# Patient Record
Sex: FEMALE | Race: WHITE | NOT HISPANIC OR LATINO | Employment: UNEMPLOYED | URBAN - NONMETROPOLITAN AREA
[De-identification: names, ages, dates, MRNs, and addresses within clinical notes are randomized per-mention and may not be internally consistent; named-entity substitution may affect disease eponyms.]

---

## 2023-07-21 PROBLEM — F41.1 GENERALIZED ANXIETY DISORDER WITH PANIC ATTACKS: Status: ACTIVE | Noted: 2023-07-21

## 2023-07-21 PROBLEM — Z90.13 H/O BILATERAL MASTECTOMY: Chronic | Status: ACTIVE | Noted: 2023-07-21

## 2023-07-21 PROBLEM — Z85.3 HISTORY OF LEFT BREAST CANCER: Status: ACTIVE | Noted: 2023-07-21

## 2023-07-21 PROBLEM — F41.1 GENERALIZED ANXIETY DISORDER WITH PANIC ATTACKS: Chronic | Status: ACTIVE | Noted: 2023-07-21

## 2023-07-21 PROBLEM — Z90.13 H/O BILATERAL MASTECTOMY: Status: ACTIVE | Noted: 2023-07-21

## 2023-07-21 PROBLEM — Z85.3 HISTORY OF LEFT BREAST CANCER: Chronic | Status: ACTIVE | Noted: 2023-07-21

## 2023-07-21 PROBLEM — F41.0 GENERALIZED ANXIETY DISORDER WITH PANIC ATTACKS: Status: ACTIVE | Noted: 2023-07-21

## 2023-07-21 PROBLEM — E66.01 CLASS 3 SEVERE OBESITY DUE TO EXCESS CALORIES WITH SERIOUS COMORBIDITY AND BODY MASS INDEX (BMI) OF 40.0 TO 44.9 IN ADULT: Status: ACTIVE | Noted: 2023-07-21

## 2023-07-21 PROBLEM — K21.9 GERD WITHOUT ESOPHAGITIS: Chronic | Status: ACTIVE | Noted: 2023-07-21

## 2023-07-21 PROBLEM — Z98.82 HISTORY OF BILATERAL BREAST IMPLANTS: Status: ACTIVE | Noted: 2023-07-21

## 2023-07-21 PROBLEM — E66.813 CLASS 3 SEVERE OBESITY DUE TO EXCESS CALORIES WITH SERIOUS COMORBIDITY AND BODY MASS INDEX (BMI) OF 40.0 TO 44.9 IN ADULT: Status: ACTIVE | Noted: 2023-07-21

## 2023-07-21 PROBLEM — Z12.11 SCREENING FOR COLON CANCER: Status: ACTIVE | Noted: 2023-07-21

## 2023-07-21 PROBLEM — E66.01 CLASS 3 SEVERE OBESITY DUE TO EXCESS CALORIES WITH SERIOUS COMORBIDITY AND BODY MASS INDEX (BMI) OF 40.0 TO 44.9 IN ADULT: Chronic | Status: ACTIVE | Noted: 2023-07-21

## 2023-07-21 PROBLEM — E66.813 CLASS 3 SEVERE OBESITY DUE TO EXCESS CALORIES WITH SERIOUS COMORBIDITY AND BODY MASS INDEX (BMI) OF 40.0 TO 44.9 IN ADULT: Chronic | Status: ACTIVE | Noted: 2023-07-21

## 2023-07-21 PROBLEM — K22.70 BARRETT'S ESOPHAGUS WITHOUT DYSPLASIA: Status: ACTIVE | Noted: 2023-07-21

## 2023-07-21 PROBLEM — K21.9 GERD WITHOUT ESOPHAGITIS: Status: ACTIVE | Noted: 2023-07-21

## 2023-07-21 PROBLEM — K22.70 BARRETT'S ESOPHAGUS WITHOUT DYSPLASIA: Chronic | Status: ACTIVE | Noted: 2023-07-21

## 2023-07-21 PROBLEM — Z00.00 GENERAL MEDICAL EXAM: Status: ACTIVE | Noted: 2023-07-21

## 2023-07-21 PROBLEM — F41.0 GENERALIZED ANXIETY DISORDER WITH PANIC ATTACKS: Chronic | Status: ACTIVE | Noted: 2023-07-21

## 2023-07-21 PROBLEM — T85.43XA RUPTURED RIGHT BREAST IMPLANT: Status: ACTIVE | Noted: 2023-07-21

## 2023-07-21 PROBLEM — Z90.710 H/O TOTAL HYSTERECTOMY: Chronic | Status: ACTIVE | Noted: 2023-07-21

## 2023-07-21 PROBLEM — Z90.710 H/O TOTAL HYSTERECTOMY: Status: ACTIVE | Noted: 2023-07-21

## 2023-08-18 ENCOUNTER — OFFICE VISIT (OUTPATIENT)
Dept: FAMILY MEDICINE CLINIC | Facility: CLINIC | Age: 53
End: 2023-08-18
Payer: COMMERCIAL

## 2023-08-18 ENCOUNTER — TELEPHONE (OUTPATIENT)
Dept: FAMILY MEDICINE CLINIC | Facility: CLINIC | Age: 53
End: 2023-08-18

## 2023-08-18 VITALS
WEIGHT: 223.5 LBS | HEART RATE: 87 BPM | OXYGEN SATURATION: 98 % | HEIGHT: 62 IN | DIASTOLIC BLOOD PRESSURE: 82 MMHG | BODY MASS INDEX: 41.13 KG/M2 | SYSTOLIC BLOOD PRESSURE: 140 MMHG

## 2023-08-18 DIAGNOSIS — Z13.1 DIABETES MELLITUS SCREENING: ICD-10-CM

## 2023-08-18 DIAGNOSIS — E66.01 CLASS 3 SEVERE OBESITY DUE TO EXCESS CALORIES WITH SERIOUS COMORBIDITY AND BODY MASS INDEX (BMI) OF 40.0 TO 44.9 IN ADULT: Chronic | ICD-10-CM

## 2023-08-18 DIAGNOSIS — K22.70 BARRETT'S ESOPHAGUS WITHOUT DYSPLASIA: Chronic | ICD-10-CM

## 2023-08-18 DIAGNOSIS — H81.12 BENIGN PAROXYSMAL POSITIONAL VERTIGO OF LEFT EAR: ICD-10-CM

## 2023-08-18 DIAGNOSIS — K21.9 GERD WITHOUT ESOPHAGITIS: ICD-10-CM

## 2023-08-18 DIAGNOSIS — F41.0 GENERALIZED ANXIETY DISORDER WITH PANIC ATTACKS: Primary | Chronic | ICD-10-CM

## 2023-08-18 DIAGNOSIS — Z00.00 GENERAL MEDICAL EXAM: Primary | ICD-10-CM

## 2023-08-18 DIAGNOSIS — T85.43XD RUPTURE OF IMPLANT OF RIGHT BREAST, SUBSEQUENT ENCOUNTER: ICD-10-CM

## 2023-08-18 DIAGNOSIS — F41.1 GENERALIZED ANXIETY DISORDER WITH PANIC ATTACKS: Primary | Chronic | ICD-10-CM

## 2023-08-18 RX ORDER — ESOMEPRAZOLE MAGNESIUM 40 MG/1
40 CAPSULE, DELAYED RELEASE ORAL
Qty: 90 CAPSULE | Refills: 1 | Status: SHIPPED | OUTPATIENT
Start: 2023-08-18

## 2023-08-18 RX ORDER — BUPROPION HYDROCHLORIDE 150 MG/1
150 TABLET ORAL EVERY MORNING
Qty: 90 TABLET | Refills: 1 | Status: SHIPPED | OUTPATIENT
Start: 2023-08-18

## 2023-08-18 NOTE — ASSESSMENT & PLAN NOTE
Given modified Epley maneuvers and discussed mild blood pressure elevation today.  We will recheck at follow-up in 6 months

## 2023-08-18 NOTE — PROGRESS NOTES
"Chief Complaint  Med Refill    Subjective    History of Present Illness:  Tania HERMOSILLO is a 53 y.o. female who presents today for follow-up after restarting treatment with Wellbutrin for anxiety and mood.    She does have a history of GERD with proven Velasquez's esophagus based upon EGD without dysplasia.  Her last EGD was 1 year ago with Dr. Mena of the [facility].  She does have plans for follow-up EGD with GI.  She continues on Nexium otc.      Prior history of left breast cancer status post bilateral mastectomy approximately 10 years ago.  She does have bilateral implants and had a recent CT scan done that showed some potential leakage of her right implant.  She is being scheduled with plastic surgery given concerns with near rupture of her right breast implant.    She declines colonoscopy but did get Cologuard done which returned negative in August 2023 with plan to repeat in August 2026.    She is on wegovy through wt loss clinic but is planning on bariatric surgery consultation.     Flareups with positional vertigo which is happened in the past.  She is interested in modified Epley maneuvers and will add meclizine over-the-counter    Objective   Vital Signs:   /82   Pulse 87   Ht 157.5 cm (62\")   Wt 101 kg (223 lb 8 oz)   SpO2 98%   BMI 40.88 kg/mý     Review of Systems   Constitutional:  Negative for appetite change, chills and fever.   HENT:  Negative for hearing loss.    Eyes:  Negative for blurred vision.   Respiratory:  Negative for chest tightness.    Cardiovascular:  Negative for chest pain.   Gastrointestinal:  Negative for abdominal pain.   Musculoskeletal:  Negative for gait problem.   Skin:  Negative for rash.   Neurological:  Positive for dizziness.   Psychiatric/Behavioral:  Negative for depressed mood.      Past History:  Medical History: has no past medical history on file.   Surgical History: has no past surgical history on file.   Family History: family history is not on " file.   Social History: reports that she has never smoked. She has never used smokeless tobacco.      Current Outpatient Medications:     buPROPion XL (Wellbutrin XL) 150 MG 24 hr tablet, Take 1 tablet by mouth Every Morning., Disp: 90 tablet, Rfl: 1    esomeprazole (nexIUM) 40 MG capsule, Take 1 capsule by mouth Every Morning Before Breakfast. BIOPSY PROVEN MILES'S ESOPHAGUS., Disp: 90 capsule, Rfl: 1    Allergies: Patient has no known allergies.    Physical Exam  Constitutional:       Appearance: Normal appearance.   HENT:      Head: Normocephalic.      Right Ear: External ear normal.      Left Ear: External ear normal.      Nose: Nose normal.   Eyes:      Pupils: Pupils are equal, round, and reactive to light.   Cardiovascular:      Rate and Rhythm: Normal rate and regular rhythm.      Heart sounds: Normal heart sounds.   Pulmonary:      Effort: Pulmonary effort is normal.      Breath sounds: Normal breath sounds.   Musculoskeletal:         General: Normal range of motion.      Cervical back: Normal range of motion.   Skin:     General: Skin is warm and dry.   Neurological:      General: No focal deficit present.      Mental Status: She is alert.      Comments: Recurrent positional left foot vertigo.  Given modified Epley's maneuvers and instructions on how to add in meclizine as needed   Psychiatric:         Mood and Affect: Mood normal.         Behavior: Behavior normal.         Thought Content: Thought content normal.        Result Review                   Assessment and Plan  Diagnoses and all orders for this visit:    1. Generalized anxiety disorder with panic attacks (Primary)  Assessment & Plan:  Psychological condition is improving with treatment.  Continue current treatment regimen.  Psychological condition  will be reassessed at the next regular appointment.    Orders:  -     buPROPion XL (Wellbutrin XL) 150 MG 24 hr tablet; Take 1 tablet by mouth Every Morning.  Dispense: 90 tablet; Refill: 1    2.  Miles's esophagus without dysplasia  Assessment & Plan:  Continue Nexium    Orders:  -     esomeprazole (nexIUM) 40 MG capsule; Take 1 capsule by mouth Every Morning Before Breakfast. BIOPSY PROVEN MILES'S ESOPHAGUS.  Dispense: 90 capsule; Refill: 1    3. Rupture of implant of right breast, subsequent encounter  Assessment & Plan:  Appointment scheduled with plastic surgery given leakage of her right breast implant      4. Class 3 severe obesity due to excess calories with serious comorbidity and body mass index (BMI) of 40.0 to 44.9 in adult  Assessment & Plan:  Patient's (Body mass index is 40.88 kg/mý.) indicates that they are morbidly/severely obese (BMI > 40 or > 35 with obesity - related health condition) with health conditions that include GERD   . Weight is improving with treatment. BMI  is above average; BMI management plan is completed. We discussed portion control and increasing exercise.       5. GERD without esophagitis  Assessment & Plan:  Working on prior authorization for Nexium given she has biopsy-proven Miles's.  Continue with Nexium over-the-counter but hopefully we can get her prior authorization approved    Orders:  -     esomeprazole (nexIUM) 40 MG capsule; Take 1 capsule by mouth Every Morning Before Breakfast. BIOPSY PROVEN MILES'S ESOPHAGUS.  Dispense: 90 capsule; Refill: 1    6. Benign paroxysmal positional vertigo of left ear  Assessment & Plan:  Given modified Epley maneuvers and discussed mild blood pressure elevation today.  We will recheck at follow-up in 6 months                     Follow Up  Return in about 6 months (around 2/18/2024) for Med recheck.    Hung Castillo MD

## 2023-08-18 NOTE — ASSESSMENT & PLAN NOTE
Patient's (Body mass index is 40.88 kg/mý.) indicates that they are morbidly/severely obese (BMI > 40 or > 35 with obesity - related health condition) with health conditions that include GERD   . Weight is improving with treatment. BMI  is above average; BMI management plan is completed. We discussed portion control and increasing exercise.

## 2023-08-18 NOTE — ASSESSMENT & PLAN NOTE
Working on prior authorization for Nexium given she has biopsy-proven Velasquez's.  Continue with Nexium over-the-counter but hopefully we can get her prior authorization approved

## 2023-08-23 ENCOUNTER — LAB (OUTPATIENT)
Dept: FAMILY MEDICINE CLINIC | Facility: CLINIC | Age: 53
End: 2023-08-23
Payer: COMMERCIAL

## 2023-08-23 DIAGNOSIS — Z00.00 GENERAL MEDICAL EXAM: ICD-10-CM

## 2023-08-23 DIAGNOSIS — Z13.1 DIABETES MELLITUS SCREENING: ICD-10-CM

## 2023-08-23 PROCEDURE — 36415 COLL VENOUS BLD VENIPUNCTURE: CPT | Performed by: FAMILY MEDICINE

## 2023-08-24 LAB
ALBUMIN SERPL-MCNC: 4.6 G/DL (ref 3.8–4.9)
ALBUMIN/GLOB SERPL: 2.2 {RATIO} (ref 1.2–2.2)
ALP SERPL-CCNC: 114 IU/L (ref 44–121)
ALT SERPL-CCNC: 9 IU/L (ref 0–32)
AST SERPL-CCNC: 8 IU/L (ref 0–40)
BASOPHILS # BLD AUTO: 0 X10E3/UL (ref 0–0.2)
BASOPHILS NFR BLD AUTO: 1 %
BILIRUB SERPL-MCNC: 0.4 MG/DL (ref 0–1.2)
BUN SERPL-MCNC: 13 MG/DL (ref 6–24)
BUN/CREAT SERPL: 14 (ref 9–23)
CALCIUM SERPL-MCNC: 9.4 MG/DL (ref 8.7–10.2)
CHLORIDE SERPL-SCNC: 105 MMOL/L (ref 96–106)
CHOLEST SERPL-MCNC: 178 MG/DL (ref 100–199)
CO2 SERPL-SCNC: 22 MMOL/L (ref 20–29)
CREAT SERPL-MCNC: 0.92 MG/DL (ref 0.57–1)
EGFRCR SERPLBLD CKD-EPI 2021: 75 ML/MIN/1.73
EOSINOPHIL # BLD AUTO: 0.1 X10E3/UL (ref 0–0.4)
EOSINOPHIL NFR BLD AUTO: 2 %
ERYTHROCYTE [DISTWIDTH] IN BLOOD BY AUTOMATED COUNT: 13.2 % (ref 11.7–15.4)
GLOBULIN SER CALC-MCNC: 2.1 G/DL (ref 1.5–4.5)
GLUCOSE SERPL-MCNC: 85 MG/DL (ref 70–99)
HBA1C MFR BLD: 5.6 % (ref 4.8–5.6)
HCT VFR BLD AUTO: 39.5 % (ref 34–46.6)
HDLC SERPL-MCNC: 76 MG/DL
HGB BLD-MCNC: 13.1 G/DL (ref 11.1–15.9)
IMM GRANULOCYTES # BLD AUTO: 0 X10E3/UL (ref 0–0.1)
IMM GRANULOCYTES NFR BLD AUTO: 0 %
LDLC SERPL CALC-MCNC: 90 MG/DL (ref 0–99)
LYMPHOCYTES # BLD AUTO: 1.5 X10E3/UL (ref 0.7–3.1)
LYMPHOCYTES NFR BLD AUTO: 36 %
MCH RBC QN AUTO: 27.8 PG (ref 26.6–33)
MCHC RBC AUTO-ENTMCNC: 33.2 G/DL (ref 31.5–35.7)
MCV RBC AUTO: 84 FL (ref 79–97)
MONOCYTES # BLD AUTO: 0.2 X10E3/UL (ref 0.1–0.9)
MONOCYTES NFR BLD AUTO: 5 %
NEUTROPHILS # BLD AUTO: 2.3 X10E3/UL (ref 1.4–7)
NEUTROPHILS NFR BLD AUTO: 56 %
PLATELET # BLD AUTO: 183 X10E3/UL (ref 150–450)
POTASSIUM SERPL-SCNC: 4.6 MMOL/L (ref 3.5–5.2)
PROT SERPL-MCNC: 6.7 G/DL (ref 6–8.5)
RBC # BLD AUTO: 4.72 X10E6/UL (ref 3.77–5.28)
SODIUM SERPL-SCNC: 142 MMOL/L (ref 134–144)
T4 FREE SERPL-MCNC: 1.32 NG/DL (ref 0.82–1.77)
TRIGL SERPL-MCNC: 64 MG/DL (ref 0–149)
TSH SERPL DL<=0.005 MIU/L-ACNC: 2.89 UIU/ML (ref 0.45–4.5)
VLDLC SERPL CALC-MCNC: 12 MG/DL (ref 5–40)
WBC # BLD AUTO: 4.1 X10E3/UL (ref 3.4–10.8)

## 2023-10-06 ENCOUNTER — OFFICE VISIT (OUTPATIENT)
Dept: FAMILY MEDICINE CLINIC | Facility: CLINIC | Age: 53
End: 2023-10-06
Payer: COMMERCIAL

## 2023-10-06 VITALS
HEIGHT: 62 IN | DIASTOLIC BLOOD PRESSURE: 78 MMHG | BODY MASS INDEX: 40.96 KG/M2 | WEIGHT: 222.6 LBS | SYSTOLIC BLOOD PRESSURE: 128 MMHG

## 2023-10-06 DIAGNOSIS — Z01.818 PREOPERATIVE EXAMINATION: Primary | ICD-10-CM

## 2023-10-06 PROCEDURE — 99213 OFFICE O/P EST LOW 20 MIN: CPT | Performed by: PHYSICIAN ASSISTANT

## 2023-10-06 PROCEDURE — 93000 ELECTROCARDIOGRAM COMPLETE: CPT | Performed by: PHYSICIAN ASSISTANT

## 2023-10-06 NOTE — PROGRESS NOTES
"    Office Note     Name: Tania HERMOSILLO    : 1970     MRN: [id number]     Chief Complaint  Pre-op Exam    Subjective     History of Present Illness:  Tania HERMOSILLO is a 53 y.o. female who presents today for preop evaluation before having her breast implants replaced after finding out that they had been draining.  She states that she is scheduled for 10/11/2023.  She states that she has already had her preop, but all she needs is an EKG to be able to have the surgery.  She denies any history of problems with anesthesia.    Review of Systems:   Review of Systems   Constitutional:  Negative for fever.   Respiratory:  Negative for shortness of breath.    Cardiovascular:  Negative for chest pain and palpitations.       Past Medical History: No past medical history on file.    Past Surgical History: No past surgical history on file.    Family History: No family history on file.    Social History:   Social History     Socioeconomic History    Marital status:    Tobacco Use    Smoking status: Never    Smokeless tobacco: Never       Immunizations:   Immunization History   Administered Date(s) Administered    31-influenza Vac Quardvalent Preservativ 2017    Fluzone (or Fluarix & Flulaval for VFC) >6mos 2014    Tdap 2017        Medications:     Current Outpatient Medications:     buPROPion XL (Wellbutrin XL) 150 MG 24 hr tablet, Take 1 tablet by mouth Every Morning., Disp: 90 tablet, Rfl: 1    esomeprazole (nexIUM) 40 MG capsule, Take 1 capsule by mouth Every Morning Before Breakfast. BIOPSY PROVEN MILES'S ESOPHAGUS., Disp: 90 capsule, Rfl: 1    Allergies:   No Known Allergies    Objective     Vital Signs  /78   Ht 157.5 cm (62\")   Wt 101 kg (222 lb 9.6 oz)   BMI 40.71 kg/m²   Estimated body mass index is 40.71 kg/m² as calculated from the following:    Height as of this encounter: 157.5 cm (62\").    Weight as of this encounter: 101 kg (222 lb 9.6 oz).        Physical " Exam  Constitutional:       Appearance: Normal appearance.   HENT:      Head: Normocephalic.      Right Ear: External ear normal.      Left Ear: External ear normal.      Nose: Nose normal.   Eyes:      Pupils: Pupils are equal, round, and reactive to light.   Cardiovascular:      Rate and Rhythm: Normal rate and regular rhythm.      Heart sounds: Normal heart sounds.   Pulmonary:      Effort: Pulmonary effort is normal. No respiratory distress.      Breath sounds: Normal breath sounds.   Musculoskeletal:         General: Normal range of motion.      Cervical back: Normal range of motion.   Skin:     General: Skin is warm and dry.   Neurological:      General: No focal deficit present.      Mental Status: She is alert.   Psychiatric:         Mood and Affect: Mood normal.         Behavior: Behavior normal.         Thought Content: Thought content normal.          ECG 12 Lead    Date/Time: 10/6/2023 10:20 AM  Performed by: Virgen Raines PA-C    Authorized by: Virgen Raines PA-C  Comparison: not compared with previous ECG   Previous ECG: no previous ECG available  Rhythm: sinus rhythm    Clinical impression: normal ECG        Assessment and Plan     Assessment/Plan:  Diagnoses and all orders for this visit:    1. Preoperative examination (Primary)  Comments:  Her EKG is WNL.    Other orders  -     ECG 12 Lead        Follow Up  Return for next scheduled f/u or sooner PRN.    Virgen Raines PA-C  [facility]

## 2024-01-25 DIAGNOSIS — F41.1 GENERALIZED ANXIETY DISORDER WITH PANIC ATTACKS: Chronic | ICD-10-CM

## 2024-01-25 DIAGNOSIS — F41.0 GENERALIZED ANXIETY DISORDER WITH PANIC ATTACKS: Chronic | ICD-10-CM

## 2024-01-25 RX ORDER — BUPROPION HYDROCHLORIDE 150 MG/1
150 TABLET ORAL EVERY MORNING
Qty: 90 TABLET | Refills: 1 | Status: SHIPPED | OUTPATIENT
Start: 2024-01-25

## 2024-01-25 NOTE — TELEPHONE ENCOUNTER
Caller: Tania HERMOSILLO    Relationship: Self    Best call back number: [phone]     Requested Prescriptions:   Requested Prescriptions     Pending Prescriptions Disp Refills    buPROPion XL (Wellbutrin XL) 150 MG 24 hr tablet 90 tablet 1     Sig: Take 1 tablet by mouth Every Morning.        Pharmacy where request should be sent: [id number] [address] [facility] [address]  [address] - [phone] [facility] [phone] FX     Last office visit with prescribing clinician: 8/18/2023   Last telemedicine visit with prescribing clinician: Visit date not found   Next office visit with prescribing clinician: 3/25/2024     Additional details provided by patient: PATIENT HAS 3 PILLS REMAINING AND DOES NOT SEE DR RUIZ TIL MARCH. PLEASE PROVIDE A NEW PRESCRIPTION WITH 2 REFILLS UNTIL SHE IS SEEN. PLEASE ADVISE     Does the patient have less than a 3 day supply:  [x] Yes  [] No    Would you like a call back once the refill request has been completed: [x] Yes [] No    If the office needs to give you a call back, can they leave a voicemail: [x] Yes [] No    Flor Cui Rep   01/25/24 11:49 EST

## 2024-03-09 ENCOUNTER — OFFICE VISIT (OUTPATIENT)
Dept: FAMILY MEDICINE CLINIC | Facility: CLINIC | Age: 54
End: 2024-03-09
Payer: COMMERCIAL

## 2024-03-09 VITALS
OXYGEN SATURATION: 97 % | BODY MASS INDEX: 41.92 KG/M2 | DIASTOLIC BLOOD PRESSURE: 84 MMHG | HEART RATE: 76 BPM | HEIGHT: 62 IN | WEIGHT: 227.8 LBS | SYSTOLIC BLOOD PRESSURE: 118 MMHG

## 2024-03-09 DIAGNOSIS — R05.1 ACUTE COUGH: Primary | ICD-10-CM

## 2024-03-09 DIAGNOSIS — K21.9 GASTROESOPHAGEAL REFLUX DISEASE, UNSPECIFIED WHETHER ESOPHAGITIS PRESENT: ICD-10-CM

## 2024-03-09 LAB
EXPIRATION DATE: NORMAL
FLUAV AG UPPER RESP QL IA.RAPID: NOT DETECTED
FLUBV AG UPPER RESP QL IA.RAPID: NOT DETECTED
INTERNAL CONTROL: NORMAL
Lab: NORMAL
SARS-COV-2 AG UPPER RESP QL IA.RAPID: NOT DETECTED

## 2024-03-09 RX ORDER — FAMOTIDINE 40 MG/1
40 TABLET, FILM COATED ORAL DAILY
Qty: 90 TABLET | Refills: 1 | Status: SHIPPED | OUTPATIENT
Start: 2024-03-09

## 2024-03-09 RX ORDER — TRIAMCINOLONE ACETONIDE 40 MG/ML
80 INJECTION, SUSPENSION INTRA-ARTICULAR; INTRAMUSCULAR ONCE
Status: COMPLETED | OUTPATIENT
Start: 2024-03-09 | End: 2024-03-09

## 2024-03-09 RX ORDER — AMOXICILLIN 500 MG/1
1000 CAPSULE ORAL 2 TIMES DAILY
Qty: 40 CAPSULE | Refills: 0 | Status: SHIPPED | OUTPATIENT
Start: 2024-03-09

## 2024-03-09 RX ADMIN — TRIAMCINOLONE ACETONIDE 80 MG: 40 INJECTION, SUSPENSION INTRA-ARTICULAR; INTRAMUSCULAR at 11:36

## 2024-03-09 NOTE — PROGRESS NOTES
Date: 2024   Patient Name: Tania HERMOSILLO  : 1970   MRN: [id number]     Chief Complaint:    Chief Complaint   Patient presents with    Sinusitis       History of Present Illness: Tania HERMOSILLO is a 54 y.o. female who is here today for cough, sore throat, sinus pressure, and headache that started a few days ago.  She did notice a change around the weather change however cough and sore throat started soon after she began taking compounded semaglutide for weight loss.  She has had abdominal bloating, nausea, belching, and worsening acid reflux.  She is currently taking Nexium 40 mg daily however this is not controlling her symptoms.  She plans to discontinue use of the semaglutide but is interested in prescription Wegovy or Zepbound if her insurance covers this.         Review of Systems:   Review of Systems   Constitutional:  Negative for chills, fatigue and fever.   HENT:  Positive for congestion, ear pain, sinus pressure and sore throat.    Respiratory:  Positive for cough. Negative for shortness of breath.    Cardiovascular:  Negative for chest pain and palpitations.   Gastrointestinal:  Positive for abdominal pain, nausea, GERD and indigestion. Negative for constipation, diarrhea and vomiting.   Musculoskeletal:  Negative for back pain and myalgias.   Neurological:  Positive for headache. Negative for dizziness.   Psychiatric/Behavioral:  Negative for depressed mood. The patient is not nervous/anxious.        Past Medical History:   Past Medical History:   Diagnosis Date    Depression     GERD (gastroesophageal reflux disease)        Past Surgical History: History reviewed. No pertinent surgical history.    Family History: History reviewed. No pertinent family history.    Social History:   Social History     Socioeconomic History    Marital status:    Tobacco Use    Smoking status: Never    Smokeless tobacco: Never   Vaping Use    Vaping status: Never Used   Substance and Sexual Activity  "   Alcohol use: Never    Drug use: Never    Sexual activity: Yes     Partners: Male       Medications:     Current Outpatient Medications:     buPROPion XL (Wellbutrin XL) 150 MG 24 hr tablet, Take 1 tablet by mouth Every Morning., Disp: 90 tablet, Rfl: 1    esomeprazole (nexIUM) 40 MG capsule, Take 1 capsule by mouth Every Morning Before Breakfast. BIOPSY PROVEN MILES'S ESOPHAGUS., Disp: 90 capsule, Rfl: 1    amoxicillin (AMOXIL) 500 MG capsule, Take 2 capsules by mouth 2 (Two) Times a Day., Disp: 40 capsule, Rfl: 0    famotidine (PEPCID) 40 MG tablet, Take 1 tablet by mouth Daily., Disp: 90 tablet, Rfl: 1  No current facility-administered medications for this visit.    Allergies:   No Known Allergies      Physical Exam:  Vital Signs:   Vitals:    03/09/24 1037   BP: 118/84   BP Location: Left arm   Patient Position: Sitting   Cuff Size: Large Adult   Pulse: 76   SpO2: 97%   Weight: 103 kg (227 lb 12.8 oz)   Height: 157.5 cm (62\")     Body mass index is 41.67 kg/m².     Physical Exam  Vitals and nursing note reviewed.   Constitutional:       Appearance: Normal appearance. She is obese.   HENT:      Right Ear: Tympanic membrane and ear canal normal.      Left Ear: Tympanic membrane and ear canal normal.      Nose: Congestion present.      Right Sinus: No maxillary sinus tenderness or frontal sinus tenderness.      Left Sinus: No maxillary sinus tenderness or frontal sinus tenderness.      Mouth/Throat:      Mouth: Mucous membranes are moist.      Pharynx: Posterior oropharyngeal erythema present. No oropharyngeal exudate.      Comments: Clear postnasal drainage  Cardiovascular:      Rate and Rhythm: Normal rate and regular rhythm.      Heart sounds: Normal heart sounds. No murmur heard.  Pulmonary:      Effort: Pulmonary effort is normal.      Breath sounds: Normal breath sounds. No wheezing.   Abdominal:      General: Bowel sounds are decreased.      Palpations: Abdomen is soft.   Neurological:      Mental " Status: She is alert.   Psychiatric:         Mood and Affect: Mood normal.         Behavior: Behavior normal.           Assessment/Plan:   Diagnoses and all orders for this visit:    1. Acute cough (Primary)  Assessment & Plan:  COVID and flu negative, she received Kenalog in office today.  I did write her a prescription of amoxicillin because she is getting ready to travel to [place] that she may take should symptoms worsen however I think most of this is allergy and GERD related.    Orders:  -     POCT SARS-CoV-2 + Flu Antigen JEANETTE  -     triamcinolone acetonide (KENALOG-40) injection 80 mg  -     amoxicillin (AMOXIL) 500 MG capsule; Take 2 capsules by mouth 2 (Two) Times a Day.  Dispense: 40 capsule; Refill: 0    2. Gastroesophageal reflux disease, unspecified whether esophagitis present  Assessment & Plan:  Patient was instructed to discontinue use of compounded semaglutide not only because of GI symptoms but because this is not regulated and not considered safe.  I have added Pepcid 40 mg to her Nexium.  She is interested in prescription GLP-1's for weight loss however I will defer to her PCP.  She is going to check and see if her insurance covers them.    Orders:  -     famotidine (PEPCID) 40 MG tablet; Take 1 tablet by mouth Daily.  Dispense: 90 tablet; Refill: 1           Follow Up:   Return if symptoms worsen or fail to improve.    Brittany Serrato, DO  [facility]

## 2024-03-09 NOTE — ASSESSMENT & PLAN NOTE
Patient was instructed to discontinue use of compounded semaglutide not only because of GI symptoms but because this is not regulated and not considered safe.  I have added Pepcid 40 mg to her Nexium.  She is interested in prescription GLP-1's for weight loss however I will defer to her PCP.  She is going to check and see if her insurance covers them.

## 2024-03-09 NOTE — ASSESSMENT & PLAN NOTE
COVID and flu negative, she received Kenalog in office today.  I did write her a prescription of amoxicillin because she is getting ready to travel to [place] that she may take should symptoms worsen however I think most of this is allergy and GERD related.

## 2024-03-25 ENCOUNTER — OFFICE VISIT (OUTPATIENT)
Dept: FAMILY MEDICINE CLINIC | Facility: CLINIC | Age: 54
End: 2024-03-25
Payer: COMMERCIAL

## 2024-03-25 VITALS
WEIGHT: 232 LBS | BODY MASS INDEX: 42.43 KG/M2 | DIASTOLIC BLOOD PRESSURE: 86 MMHG | SYSTOLIC BLOOD PRESSURE: 128 MMHG | OXYGEN SATURATION: 98 % | HEART RATE: 90 BPM

## 2024-03-25 DIAGNOSIS — K21.9 GERD WITHOUT ESOPHAGITIS: Primary | ICD-10-CM

## 2024-03-25 DIAGNOSIS — E66.01 CLASS 3 SEVERE OBESITY DUE TO EXCESS CALORIES WITH SERIOUS COMORBIDITY AND BODY MASS INDEX (BMI) OF 40.0 TO 44.9 IN ADULT: Chronic | ICD-10-CM

## 2024-03-25 DIAGNOSIS — F41.1 GENERALIZED ANXIETY DISORDER WITH PANIC ATTACKS: Chronic | ICD-10-CM

## 2024-03-25 DIAGNOSIS — Z98.82 HISTORY OF BILATERAL BREAST IMPLANTS: ICD-10-CM

## 2024-03-25 DIAGNOSIS — R10.13 EPIGASTRIC PAIN: ICD-10-CM

## 2024-03-25 DIAGNOSIS — Z90.13 H/O BILATERAL MASTECTOMY: ICD-10-CM

## 2024-03-25 DIAGNOSIS — R73.9 HYPERGLYCEMIA: ICD-10-CM

## 2024-03-25 DIAGNOSIS — K22.70 BARRETT'S ESOPHAGUS WITHOUT DYSPLASIA: Chronic | ICD-10-CM

## 2024-03-25 DIAGNOSIS — F41.0 GENERALIZED ANXIETY DISORDER WITH PANIC ATTACKS: Chronic | ICD-10-CM

## 2024-03-25 RX ORDER — FAMOTIDINE 40 MG/1
40 TABLET, FILM COATED ORAL DAILY
Qty: 90 TABLET | Refills: 1 | Status: SHIPPED | OUTPATIENT
Start: 2024-03-25

## 2024-03-25 RX ORDER — ESOMEPRAZOLE MAGNESIUM 40 MG/1
40 CAPSULE, DELAYED RELEASE ORAL
Qty: 90 CAPSULE | Refills: 1 | Status: SHIPPED | OUTPATIENT
Start: 2024-03-25

## 2024-03-25 RX ORDER — BUPROPION HYDROCHLORIDE 300 MG/1
300 TABLET ORAL EVERY MORNING
Qty: 90 TABLET | Refills: 1 | Status: SHIPPED | OUTPATIENT
Start: 2024-03-25

## 2024-03-25 NOTE — ASSESSMENT & PLAN NOTE
Discussed concerns with cosmetic appearance after implants and desire to see a different plastic surgeon due to a issues related to her previous plastic surgeon only being willing to use local anesthesia.  Scheduling consultation with plastic surgery

## 2024-03-25 NOTE — PROGRESS NOTES
Chief Complaint  Abd pain after taking compounded mounjaro from wt loss clinic, followup after breast implant replacement and ongoing cosmetic problems, would like labs done    Subjective    History of Present Illness:  Tania HERMOSILLO is a 54 y.o. female who presents today for follow-up visit and medication refills.    Significant side effects with compounded Mounjaro through local weight loss clinic.  She is doing better after she stopped Mounjaro compounded through the weight loss clinic but still is having reflux flareups.  Continues Nexium and Pepcid without dysphagia.    Wellbutrin is doing well for history of anxiety and irritability and she would like to go up to 300 mg to see if this would also help with appetite.    She did have reconstructive implant replacement and still have some problems with the cosmetic appearance and would like a referral to a plastic surgeon that uses general anesthesia given the plastic surgeon she was able to see in [place] would only use local anesthesia and this was very uncomfortable and contributed to a poor experience.    Objective   Vital Signs:   /86   Pulse 90   Wt 105 kg (232 lb)   SpO2 98%   BMI 42.43 kg/m²     Review of Systems   Constitutional:  Negative for appetite change, chills and fever.   HENT:  Negative for hearing loss.    Eyes:  Negative for blurred vision.   Respiratory:  Negative for chest tightness.    Cardiovascular:  Negative for chest pain.   Gastrointestinal:  Negative for abdominal pain.   Musculoskeletal:  Negative for gait problem.   Skin:  Negative for rash.   Psychiatric/Behavioral:  Negative for depressed mood.        Past History:  Medical History: has a past medical history of Depression and GERD (gastroesophageal reflux disease).   Surgical History: has no past surgical history on file.   Family History: family history is not on file.   Social History: reports that she has never smoked. She has never used smokeless tobacco. She  reports that she does not drink alcohol and does not use drugs.      Current Outpatient Medications:     buPROPion XL (Wellbutrin XL) 300 MG 24 hr tablet, Take 1 tablet by mouth Every Morning., Disp: 90 tablet, Rfl: 1    esomeprazole (nexIUM) 40 MG capsule, Take 1 capsule by mouth Every Morning Before Breakfast. BIOPSY PROVEN MILES'S ESOPHAGUS., Disp: 90 capsule, Rfl: 1    famotidine (PEPCID) 40 MG tablet, Take 1 tablet by mouth Daily., Disp: 90 tablet, Rfl: 1    Allergies: Patient has no known allergies.    Physical Exam  Constitutional:       Appearance: She is obese.   HENT:      Head: Normocephalic.      Right Ear: External ear normal.      Left Ear: External ear normal.      Nose: Nose normal.   Eyes:      Pupils: Pupils are equal, round, and reactive to light.   Cardiovascular:      Rate and Rhythm: Normal rate and regular rhythm.      Heart sounds: Normal heart sounds.   Pulmonary:      Effort: Pulmonary effort is normal.      Breath sounds: Normal breath sounds.   Musculoskeletal:         General: Normal range of motion.      Cervical back: Normal range of motion.   Skin:     General: Skin is warm and dry.   Neurological:      General: No focal deficit present.      Mental Status: She is alert.   Psychiatric:         Mood and Affect: Mood normal.         Behavior: Behavior normal.         Thought Content: Thought content normal.          Result Review                   Assessment and Plan  Diagnoses and all orders for this visit:    1. GERD without esophagitis (Primary)  Assessment & Plan:  Refilled Pepcid and Nexium.  No dysphagia    Orders:  -     esomeprazole (nexIUM) 40 MG capsule; Take 1 capsule by mouth Every Morning Before Breakfast. BIOPSY PROVEN MILES'S ESOPHAGUS.  Dispense: 90 capsule; Refill: 1  -     famotidine (PEPCID) 40 MG tablet; Take 1 tablet by mouth Daily.  Dispense: 90 tablet; Refill: 1    2. Miles's esophagus without dysplasia  -     esomeprazole (nexIUM) 40 MG capsule; Take 1  capsule by mouth Every Morning Before Breakfast. BIOPSY PROVEN MILES'S ESOPHAGUS.  Dispense: 90 capsule; Refill: 1  -     famotidine (PEPCID) 40 MG tablet; Take 1 tablet by mouth Daily.  Dispense: 90 tablet; Refill: 1    3. Generalized anxiety disorder with panic attacks  Assessment & Plan:  Psychological condition is improving with treatment.  Medication changes per orders.  Psychological condition  will be reassessed at the next regular appointment.    Orders:  -     buPROPion XL (Wellbutrin XL) 300 MG 24 hr tablet; Take 1 tablet by mouth Every Morning.  Dispense: 90 tablet; Refill: 1  -     TSH; Future  -     T4, Free; Future  -     T3, free; Future  -     TSH  -     T4, Free  -     T3, free    4. Epigastric pain  Assessment & Plan:  Continue with Nexium and Pepcid.  We will check pancreas enzyme levels with labs today.    Orders:  -     CBC Auto Differential; Future  -     Comprehensive Metabolic Panel; Future  -     Amylase; Future  -     Lipase; Future  -     TSH; Future  -     T4, Free; Future  -     T3, free; Future  -     CBC Auto Differential  -     Comprehensive Metabolic Panel  -     Amylase  -     Lipase  -     TSH  -     T4, Free  -     T3, free    5. Hyperglycemia  Assessment & Plan:  Encouraged diet and exercise efforts.  Awaiting recheck on labs    Orders:  -     Hemoglobin A1c; Future  -     Hemoglobin A1c    6. H/O bilateral mastectomy  Assessment & Plan:  Discussed concerns with cosmetic appearance after implants and desire to see a different plastic surgeon due to a issues related to her previous plastic surgeon only being willing to use local anesthesia.  Scheduling consultation with plastic surgery    Orders:  -     Ambulatory Referral to Plastic Surgery    7. History of bilateral breast implants  -     Ambulatory Referral to Plastic Surgery    8. Class 3 severe obesity due to excess calories with serious comorbidity and body mass index (BMI) of 40.0 to 44.9 in adult  Assessment &  Plan:  Patient's (Body mass index is 42.43 kg/m².) indicates that they are morbidly/severely obese (BMI > 40 or > 35 with obesity - related health condition) with health conditions that include GERD   . Weight is improving with treatment. BMI  is above average; BMI management plan is completed. We discussed portion control and increasing exercise.                      Follow Up  Return in about 6 months (around 9/25/2024) for Med recheck, Fasting for labs at appointment (but drink water!).    Hung Castillo MD

## 2024-03-25 NOTE — ASSESSMENT & PLAN NOTE
Patient's (Body mass index is 42.43 kg/m².) indicates that they are morbidly/severely obese (BMI > 40 or > 35 with obesity - related health condition) with health conditions that include GERD   . Weight is improving with treatment. BMI  is above average; BMI management plan is completed. We discussed portion control and increasing exercise.

## 2024-03-26 LAB
ALBUMIN SERPL-MCNC: 4.8 G/DL (ref 3.8–4.9)
ALBUMIN/GLOB SERPL: 2.3 {RATIO} (ref 1.2–2.2)
ALP SERPL-CCNC: 155 IU/L (ref 44–121)
ALT SERPL-CCNC: 11 IU/L (ref 0–32)
AMYLASE SERPL-CCNC: 72 U/L (ref 31–110)
AST SERPL-CCNC: 9 IU/L (ref 0–40)
BASOPHILS # BLD AUTO: 0 X10E3/UL (ref 0–0.2)
BASOPHILS NFR BLD AUTO: 0 %
BILIRUB SERPL-MCNC: 0.2 MG/DL (ref 0–1.2)
BUN SERPL-MCNC: 17 MG/DL (ref 6–24)
BUN/CREAT SERPL: 19 (ref 9–23)
CALCIUM SERPL-MCNC: 9.8 MG/DL (ref 8.7–10.2)
CHLORIDE SERPL-SCNC: 103 MMOL/L (ref 96–106)
CO2 SERPL-SCNC: 22 MMOL/L (ref 20–29)
CREAT SERPL-MCNC: 0.89 MG/DL (ref 0.57–1)
EGFRCR SERPLBLD CKD-EPI 2021: 77 ML/MIN/1.73
EOSINOPHIL # BLD AUTO: 0.1 X10E3/UL (ref 0–0.4)
EOSINOPHIL NFR BLD AUTO: 2 %
ERYTHROCYTE [DISTWIDTH] IN BLOOD BY AUTOMATED COUNT: 13.9 % (ref 11.7–15.4)
GLOBULIN SER CALC-MCNC: 2.1 G/DL (ref 1.5–4.5)
GLUCOSE SERPL-MCNC: 127 MG/DL (ref 70–99)
HBA1C MFR BLD: 5.7 % (ref 4.8–5.6)
HCT VFR BLD AUTO: 39.5 % (ref 34–46.6)
HGB BLD-MCNC: 12.7 G/DL (ref 11.1–15.9)
IMM GRANULOCYTES # BLD AUTO: 0 X10E3/UL (ref 0–0.1)
IMM GRANULOCYTES NFR BLD AUTO: 0 %
LIPASE SERPL-CCNC: 69 U/L (ref 14–72)
LYMPHOCYTES # BLD AUTO: 1.5 X10E3/UL (ref 0.7–3.1)
LYMPHOCYTES NFR BLD AUTO: 24 %
MCH RBC QN AUTO: 26.7 PG (ref 26.6–33)
MCHC RBC AUTO-ENTMCNC: 32.2 G/DL (ref 31.5–35.7)
MCV RBC AUTO: 83 FL (ref 79–97)
MONOCYTES # BLD AUTO: 0.3 X10E3/UL (ref 0.1–0.9)
MONOCYTES NFR BLD AUTO: 5 %
NEUTROPHILS # BLD AUTO: 4.4 X10E3/UL (ref 1.4–7)
NEUTROPHILS NFR BLD AUTO: 69 %
PLATELET # BLD AUTO: 266 X10E3/UL (ref 150–450)
POTASSIUM SERPL-SCNC: 4.8 MMOL/L (ref 3.5–5.2)
PROT SERPL-MCNC: 6.9 G/DL (ref 6–8.5)
RBC # BLD AUTO: 4.76 X10E6/UL (ref 3.77–5.28)
SODIUM SERPL-SCNC: 142 MMOL/L (ref 134–144)
T3FREE SERPL-MCNC: 3.1 PG/ML (ref 2–4.4)
T4 FREE SERPL-MCNC: 1.3 NG/DL (ref 0.82–1.77)
TSH SERPL DL<=0.005 MIU/L-ACNC: 1.25 UIU/ML (ref 0.45–4.5)
WBC # BLD AUTO: 6.4 X10E3/UL (ref 3.4–10.8)

## 2024-03-26 NOTE — PROGRESS NOTES
THE MESSAGE BELOW IS ABLE TO BE GIVEN BY THE HUB.  THE HUB MAY SCHEDULE A FOLLOW-UP VISIT FOR THE PATIENT IF INDICATED IN THE MESSAGE BELOW...    Please contact patient with the remainder of lab results:    Pancreas enzyme levels returned normal.    Her thyroid function blood work returned normal

## 2024-03-26 NOTE — PROGRESS NOTES
THE MESSAGE BELOW IS ABLE TO BE GIVEN BY THE HUB.  THE HUB MAY SCHEDULE A FOLLOW-UP VISIT FOR THE PATIENT IF INDICATED IN THE MESSAGE BELOW...    Please call patient with lab results that have returned thus far:    Her recent comprehensive metabolic panel did return with normal kidney function, mild elevation in her alkaline phosphatase level which may be from recent medications we will just recheck this with future labs.    Her nonfasting blood sugar was elevated at 127 but this is okay given she was not fasting at the time of her labs.  Her electrolyte levels returned good.    Her A1c was slightly above normal at 5.7 in the prediabetes range.  Please have her work on low sugar and low carbohydrate diet with exercise efforts and weight loss efforts to help prevent progression of diabetes.    Her blood count returned normal with no anemia or evidence for infection.    We are still waiting on her pancreas enzyme levels and thyroid levels to return and we will contact her with those results when they become available.    She will receive a call regarding the referral to plastic surgery of [facility] as soon as this is scheduled.

## 2024-03-26 NOTE — PROGRESS NOTES
THE MESSAGE BELOW IS ABLE TO BE GIVEN BY THE HUB.  THE HUB MAY SCHEDULE A FOLLOW-UP VISIT FOR THE PATIENT IF INDICATED IN THE MESSAGE BELOW...    Please call patient with her TSH level that returned normal.  We are still waiting on the remainder of her thyroid levels and pancreas enzyme levels to return

## 2024-04-09 ENCOUNTER — TELEPHONE (OUTPATIENT)
Dept: FAMILY MEDICINE CLINIC | Facility: CLINIC | Age: 54
End: 2024-04-09
Payer: COMMERCIAL

## 2024-04-09 NOTE — TELEPHONE ENCOUNTER
If she would like to stay in [place] I usually recommend Dr Sanchez or Dr De La Cruz... if she prefers louie/odalys then I do recommend Dr. Og

## 2024-04-09 NOTE — TELEPHONE ENCOUNTER
Caller: BAY Tania MAX    Relationship: Self    Best call back number:  [phone]     What is the medical concern/diagnosis: GALLSTONES AND SLUGGISH GALLBLADDER.    What specialty or service is being requested: SURGEON    What is the provider, practice or medical service name: N/A    What is the office location: N/A    What is the office phone number: N/A    Any additional details:  PATIENT WAS SEEN AT [facility] ED ON 4/7/24 FOR THIS ISSUE. THEY REFERRED HER TO A SURGEON BUT SHE WANTS DR RUIZ'S OPINION ABOUT WHO SHE SHOULD SEE INSTEAD.  PLEASE ADVISE PATIENT.  THANK YOU.

## 2024-04-12 NOTE — TELEPHONE ENCOUNTER
Left Message: If Pt calls back ok for Hub to relay--  If she would like to stay in [place] I usually recommend Dr Sanchez or Dr De La Cruz... if she prefers louie/odalys then I do recommend Dr. Og

## 2024-04-12 NOTE — TELEPHONE ENCOUNTER
Name: Tania HERMOSILLO    Relationship: Self    Best Callback Number: [phone]     HUB PROVIDED THE RELAY MESSAGE FROM THE OFFICE   PATIENT VOICED UNDERSTANDING AND HAS NO FURTHER QUESTIONS AT THIS TIME    ADDITIONAL INFORMATION:  PATIENT STATED THAT SHE WOULD LIKE FOR REFERRAL TO SEE Sofiya Carolina MA Medical Assistant Signed [id number]       Left Message: If Pt calls back ok for Hub to relay--  If she would like to stay in [place] I usually recommend Dr Sanchez or Dr De La Cruz... [facility] I do [facility] Dr. Og

## 2024-04-22 ENCOUNTER — TELEPHONE (OUTPATIENT)
Dept: FAMILY MEDICINE CLINIC | Facility: CLINIC | Age: 54
End: 2024-04-22
Payer: COMMERCIAL

## 2024-04-22 DIAGNOSIS — K21.9 GERD WITHOUT ESOPHAGITIS: Primary | ICD-10-CM

## 2024-04-22 NOTE — TELEPHONE ENCOUNTER
Patient called and states that she wants to see Dr. De La Cruz for her referral on her gallstones/gallbladder. She states that she mentioned this last week and was told to give us a call if she hasn't heard anything to get this referral scheduled.

## 2024-06-29 ENCOUNTER — OFFICE VISIT (OUTPATIENT)
Dept: FAMILY MEDICINE CLINIC | Facility: CLINIC | Age: 54
End: 2024-06-29
Payer: COMMERCIAL

## 2024-06-29 VITALS
BODY MASS INDEX: 43.98 KG/M2 | WEIGHT: 239 LBS | HEART RATE: 90 BPM | SYSTOLIC BLOOD PRESSURE: 132 MMHG | TEMPERATURE: 98.8 F | OXYGEN SATURATION: 97 % | HEIGHT: 62 IN | DIASTOLIC BLOOD PRESSURE: 80 MMHG

## 2024-06-29 DIAGNOSIS — J40 BRONCHITIS: Primary | ICD-10-CM

## 2024-06-29 DIAGNOSIS — R05.1 ACUTE COUGH: ICD-10-CM

## 2024-06-29 PROCEDURE — 87428 SARSCOV & INF VIR A&B AG IA: CPT | Performed by: FAMILY MEDICINE

## 2024-06-29 PROCEDURE — 96372 THER/PROPH/DIAG INJ SC/IM: CPT | Performed by: FAMILY MEDICINE

## 2024-06-29 PROCEDURE — 99213 OFFICE O/P EST LOW 20 MIN: CPT | Performed by: FAMILY MEDICINE

## 2024-06-29 RX ORDER — TRIAMCINOLONE ACETONIDE 40 MG/ML
80 INJECTION, SUSPENSION INTRA-ARTICULAR; INTRAMUSCULAR ONCE
Status: COMPLETED | OUTPATIENT
Start: 2024-06-29 | End: 2024-06-29

## 2024-06-29 RX ORDER — AZITHROMYCIN 250 MG/1
TABLET, FILM COATED ORAL
Qty: 6 TABLET | Refills: 0 | Status: SHIPPED | OUTPATIENT
Start: 2024-06-29

## 2024-06-29 RX ADMIN — TRIAMCINOLONE ACETONIDE 80 MG: 40 INJECTION, SUSPENSION INTRA-ARTICULAR; INTRAMUSCULAR at 09:45

## 2024-06-29 NOTE — PROGRESS NOTES
Date: 2024   Patient Name: Tania HERMOSILLO  : 1970   MRN: [id number]     Chief Complaint:    Chief Complaint   Patient presents with    Sinusitis     Symptoms began yesterday, cough, chills       History of Present Illness: Tania HERMOSILLO is a 54 y.o. female who is here today for cough and postnasal drainage that started yesterday.  She has also had chills but denies fever.         Review of Systems:   Review of Systems   Constitutional:  Positive for chills. Negative for fever.   Respiratory:  Positive for cough, chest tightness and shortness of breath. Negative for wheezing.    Cardiovascular:  Negative for chest pain.   Gastrointestinal:  Negative for abdominal pain.   Neurological:  Negative for dizziness and headache.   Psychiatric/Behavioral:  Negative for depressed mood. The patient is not nervous/anxious.        Past Medical History:   Past Medical History:   Diagnosis Date    Depression     GERD (gastroesophageal reflux disease)        Past Surgical History: History reviewed. No pertinent surgical history.    Family History: History reviewed. No pertinent family history.    Social History:   Social History     Socioeconomic History    Marital status:    Tobacco Use    Smoking status: Never    Smokeless tobacco: Never   Vaping Use    Vaping status: Never Used   Substance and Sexual Activity    Alcohol use: Never    Drug use: Never    Sexual activity: Yes     Partners: Male       Medications:     Current Outpatient Medications:     buPROPion XL (Wellbutrin XL) 300 MG 24 hr tablet, Take 1 tablet by mouth Every Morning., Disp: 90 tablet, Rfl: 1    esomeprazole (nexIUM) 40 MG capsule, Take 1 capsule by mouth Every Morning Before Breakfast. BIOPSY PROVEN MILES'S ESOPHAGUS., Disp: 90 capsule, Rfl: 1    famotidine (PEPCID) 40 MG tablet, Take 1 tablet by mouth Daily., Disp: 90 tablet, Rfl: 1    azithromycin (Zithromax Z-Rohan) 250 MG tablet, 2 tabs p.o. on day 1 followed by 1 tab p.o. daily  "for 4 days, Disp: 6 tablet, Rfl: 0  No current facility-administered medications for this visit.    Allergies:   No Known Allergies      Physical Exam:  Vital Signs:   Vitals:    06/29/24 0907   BP: 132/80   BP Location: Right arm   Patient Position: Sitting   Cuff Size: Large Adult   Pulse: 90   Temp: 98.8 °F (37.1 °C)   TempSrc: Oral   SpO2: 97%   Weight: 108 kg (239 lb)   Height: 157.5 cm (62\")     Body mass index is 43.71 kg/m².     Physical Exam  Vitals and nursing note reviewed.   Constitutional:       Appearance: Normal appearance.   HENT:      Mouth/Throat:      Comments: Thick postnasal drainage  Cardiovascular:      Rate and Rhythm: Normal rate and regular rhythm.      Heart sounds: Normal heart sounds.   Pulmonary:      Effort: Pulmonary effort is normal.      Breath sounds: Normal breath sounds. No wheezing.      Comments: Hacking cough  Neurological:      Mental Status: She is alert and oriented to person, place, and time. Mental status is at baseline.   Psychiatric:         Mood and Affect: Mood normal.         Behavior: Behavior normal.           Assessment/Plan:   Diagnoses and all orders for this visit:    1. Bronchitis (Primary)  Assessment & Plan:  Rx Azithromycin and pt received Kenalog in office, will call if sx persist or worsen    Orders:  -     azithromycin (Zithromax Z-Rohan) 250 MG tablet; 2 tabs p.o. on day 1 followed by 1 tab p.o. daily for 4 days  Dispense: 6 tablet; Refill: 0  -     triamcinolone acetonide (KENALOG-40) injection 80 mg    2. Acute cough  Assessment & Plan:  COVID and flu negative    Orders:  -     Covid-19 + Flu A&B AG, Veritor           Follow Up:   Return if symptoms worsen or fail to improve.    Brittany Serrato, DO  [facility]    "

## 2024-07-02 ENCOUNTER — OFFICE VISIT (OUTPATIENT)
Dept: FAMILY MEDICINE CLINIC | Facility: CLINIC | Age: 54
End: 2024-07-02
Payer: COMMERCIAL

## 2024-07-02 VITALS
SYSTOLIC BLOOD PRESSURE: 114 MMHG | TEMPERATURE: 99.1 F | WEIGHT: 239 LBS | BODY MASS INDEX: 43.98 KG/M2 | HEART RATE: 95 BPM | DIASTOLIC BLOOD PRESSURE: 80 MMHG | OXYGEN SATURATION: 98 % | HEIGHT: 62 IN

## 2024-07-02 DIAGNOSIS — J10.1 INFLUENZA A: ICD-10-CM

## 2024-07-02 DIAGNOSIS — R05.1 ACUTE COUGH: Primary | ICD-10-CM

## 2024-07-02 PROBLEM — R50.9 FEVER AND CHILLS: Status: ACTIVE | Noted: 2024-07-02

## 2024-07-02 LAB
EXPIRATION DATE: ABNORMAL
FLUAV AG UPPER RESP QL IA.RAPID: DETECTED
FLUBV AG UPPER RESP QL IA.RAPID: NOT DETECTED
INTERNAL CONTROL: ABNORMAL
Lab: ABNORMAL
SARS-COV-2 AG UPPER RESP QL IA.RAPID: NOT DETECTED

## 2024-07-02 PROCEDURE — 99213 OFFICE O/P EST LOW 20 MIN: CPT | Performed by: PHYSICIAN ASSISTANT

## 2024-07-02 PROCEDURE — 87428 SARSCOV & INF VIR A&B AG IA: CPT | Performed by: PHYSICIAN ASSISTANT

## 2024-07-02 RX ORDER — DEXTROMETHORPHAN HYDROBROMIDE AND PROMETHAZINE HYDROCHLORIDE 15; 6.25 MG/5ML; MG/5ML
5 SYRUP ORAL 4 TIMES DAILY PRN
Qty: 180 ML | Refills: 0 | Status: SHIPPED | OUTPATIENT
Start: 2024-07-02

## 2024-07-02 NOTE — ASSESSMENT & PLAN NOTE
Patient positive for influenza A this date.  Patient was provided with Kenalog injection 3 days ago.  Prescribed Promethazine DM for relief of cough.  Recommended probiotics for GI symptoms.  Advised patient to increase fluids as tolerated.  Continue to take over-the-counter medication for fever and chills.  Discussed signs of worsening symptoms advise ER today occur.  Patient knowledge understanding.

## 2024-07-02 NOTE — PROGRESS NOTES
"Chief Complaint  Headache, Diarrhea, and Cough    Subjective        Tania HERMOSILLO presents to [facility]  History of Present Illness  Patient reports today secondary to not feeling well for the past 4 days.  Patient reports being seen in clinic 3 days ago.  Reports she was negative for COVID and flu.  States she was provided with a steroid shot and azithromycin.  Patient reports since that day she continues to have fever low-grade at home.  Patient reports she continues to cough and feel fatigued.  Reports her granddaughter tested positive for flu a.  Patient denies any shortness of breath, chest pain or palpitations.  Denies any difficulty breathing  Headache  Diarrhea   Associated symptoms include coughing and headaches.   Cough  Associated symptoms include headaches.       Objective   Vital Signs:  /80   Pulse 95   Temp 99.1 °F (37.3 °C)   Ht 157.5 cm (62\")   Wt 108 kg (239 lb)   SpO2 98%   BMI 43.71 kg/m²   Estimated body mass index is 43.71 kg/m² as calculated from the following:    Height as of this encounter: 157.5 cm (62\").    Weight as of this encounter: 108 kg (239 lb).               Physical Exam  Vitals and nursing note reviewed.   Constitutional:       General: She is not in acute distress.     Appearance: Normal appearance.   HENT:      Head: Normocephalic.      Right Ear: Hearing normal.      Left Ear: Hearing normal.   Eyes:      Pupils: Pupils are equal, round, and reactive to light.   Cardiovascular:      Rate and Rhythm: Normal rate and regular rhythm.   Pulmonary:      Effort: Pulmonary effort is normal. No respiratory distress.   Skin:     General: Skin is warm and dry.   Neurological:      Mental Status: She is alert.   Psychiatric:         Mood and Affect: Mood normal.        Result Review :                     Assessment and Plan     Diagnoses and all orders for this visit:    1. Acute cough (Primary)  Assessment & Plan:  Patient positive for " influenza A this date.  Patient was provided with Kenalog injection 3 days ago.  Prescribed Promethazine DM for relief of cough.  Recommended probiotics for GI symptoms.  Advised patient to increase fluids as tolerated.  Continue to take over-the-counter medication for fever and chills.  Discussed signs of worsening symptoms advise ER today occur.  Patient knowledge understanding.      2. Influenza A  Assessment & Plan:  See plan above               Follow Up     Return if symptoms worsen or fail to improve.  Patient was given instructions and counseling regarding her condition or for health maintenance advice. Please see specific information pulled into the AVS if appropriate.

## 2024-07-09 ENCOUNTER — TELEMEDICINE (OUTPATIENT)
Dept: FAMILY MEDICINE CLINIC | Facility: CLINIC | Age: 54
End: 2024-07-09
Payer: COMMERCIAL

## 2024-07-09 VITALS — HEIGHT: 63 IN | TEMPERATURE: 98.3 F | BODY MASS INDEX: 51.91 KG/M2 | WEIGHT: 293 LBS

## 2024-07-09 DIAGNOSIS — J01.90 ACUTE NON-RECURRENT SINUSITIS, UNSPECIFIED LOCATION: Primary | ICD-10-CM

## 2024-07-09 PROCEDURE — 99213 OFFICE O/P EST LOW 20 MIN: CPT | Performed by: PHYSICIAN ASSISTANT

## 2024-07-09 RX ORDER — PREDNISONE 20 MG/1
TABLET ORAL
Qty: 20 TABLET | Refills: 0 | Status: SHIPPED | OUTPATIENT
Start: 2024-07-09 | End: 2024-07-19

## 2024-07-09 RX ORDER — CHLORCYCLIZINE HYDROCHLORIDE AND PSEUDOEPHEDRINE HYDROCHLORIDE 25; 60 MG/1; MG/1
1 TABLET ORAL 2 TIMES DAILY
Qty: 30 TABLET | Refills: 0 | Status: SHIPPED | OUTPATIENT
Start: 2024-07-09

## 2024-07-09 RX ORDER — AMOXICILLIN AND CLAVULANATE POTASSIUM 875; 125 MG/1; MG/1
1 TABLET, FILM COATED ORAL 2 TIMES DAILY
Qty: 20 TABLET | Refills: 0 | Status: SHIPPED | OUTPATIENT
Start: 2024-07-09 | End: 2024-07-19

## 2024-07-09 NOTE — ASSESSMENT & PLAN NOTE
Rx for Augmentin, Stahist, prednisone taper and she has Promethazine DM leftover at home which she can take for any cough.  Use over-the-counter medicines for fever or chills and call or return if symptoms persist or worsen.

## 2024-07-09 NOTE — PROGRESS NOTES
"Chief Complaint  Sinusitis (X5 days)    Subjective          Tania HERMOSILLO presents to [facility]    Patient was seen today through synchronous audio/video technology. Verbal consent was obtained. For the purpose of this visit the provider is located at [facility].  The patient was located at home. Vitals signs were not obtained due to lack of home monitoring access. Time spent with patient was 15 minutes.    Sinusitis  Associated symptoms include congestion and sinus pressure. Pertinent negatives include no chills, coughing, shortness of breath, sore throat or swollen glands.    patient is being seen today via video visit for complaints of sinus pain pressure and congestion that has been ongoing for 5 days and progressively worsening.  She is having a lot of postnasal drip and thick nasal drainage and she thinks she has a sinus infection.    Objective   Vital Signs:   Temp 98.3 °F (36.8 °C)   Ht 160 cm (63\")   Wt 134 kg (296 lb) Comment: Pt reported vitals  BMI 52.43 kg/m²     Body mass index is 52.43 kg/m².    Review of Systems   Constitutional:  Negative for chills, fatigue and fever.   HENT:  Positive for congestion, postnasal drip and sinus pressure. Negative for facial swelling, sore throat and swollen glands.    Respiratory:  Negative for cough, chest tightness, shortness of breath and wheezing.    Cardiovascular:  Negative for chest pain and palpitations.   Musculoskeletal:  Negative for back pain and myalgias.   Neurological:  Negative for dizziness and headache.   Psychiatric/Behavioral:  Negative for depressed mood. The patient is not nervous/anxious.        Past History:  Medical History: has a past medical history of Depression and GERD (gastroesophageal reflux disease).   Surgical History: has no past surgical history on file.   Family History: family history includes Diabetes in her mother and sister.   Social History: reports that she has never smoked. " She has never used smokeless tobacco. She reports that she does not currently use alcohol. She reports that she does not use drugs.      Current Outpatient Medications:     buPROPion XL (Wellbutrin XL) 300 MG 24 hr tablet, Take 1 tablet by mouth Every Morning., Disp: 90 tablet, Rfl: 1    esomeprazole (nexIUM) 40 MG capsule, Take 1 capsule by mouth Every Morning Before Breakfast. BIOPSY PROVEN MILES'S ESOPHAGUS., Disp: 90 capsule, Rfl: 1    famotidine (PEPCID) 40 MG tablet, Take 1 tablet by mouth Daily., Disp: 90 tablet, Rfl: 1    promethazine-dextromethorphan (PROMETHAZINE-DM) 6.25-15 MG/5ML syrup, Take 5 mL by mouth 4 (Four) Times a Day As Needed for Cough. Caution sedation, Disp: 180 mL, Rfl: 0    amoxicillin-clavulanate (AUGMENTIN) 875-125 MG per tablet, Take 1 tablet by mouth 2 (Two) Times a Day for 10 days., Disp: 20 tablet, Rfl: 0    Chlorcyclizine-Pseudoephed (Stahist AD) 25-60 MG tablet, Take 1 tablet by mouth 2 (Two) Times a Day., Disp: 30 tablet, Rfl: 0    predniSONE (DELTASONE) 20 MG tablet, Take 3 tablets by mouth Daily for 3 days, THEN 2 tablets Daily for 3 days, THEN 1 tablet Daily for 5 days., Disp: 20 tablet, Rfl: 0    Allergies: Patient has no known allergies.    Physical Exam  Constitutional:       Appearance: Normal appearance.   Neurological:      Mental Status: She is alert and oriented to person, place, and time.   Psychiatric:         Mood and Affect: Mood normal.         Behavior: Behavior normal.          Result Review :                   Assessment and Plan    Diagnoses and all orders for this visit:    1. Acute non-recurrent sinusitis, unspecified location (Primary)  Assessment & Plan:  Rx for Augmentin, Stahist, prednisone taper and she has Promethazine DM leftover at home which she can take for any cough.  Use over-the-counter medicines for fever or chills and call or return if symptoms persist or worsen.      Other orders  -     amoxicillin-clavulanate (AUGMENTIN) 875-125 MG per  tablet; Take 1 tablet by mouth 2 (Two) Times a Day for 10 days.  Dispense: 20 tablet; Refill: 0  -     predniSONE (DELTASONE) 20 MG tablet; Take 3 tablets by mouth Daily for 3 days, THEN 2 tablets Daily for 3 days, THEN 1 tablet Daily for 5 days.  Dispense: 20 tablet; Refill: 0  -     Chlorcyclizine-Pseudoephed (Stahist AD) 25-60 MG tablet; Take 1 tablet by mouth 2 (Two) Times a Day.  Dispense: 30 tablet; Refill: 0        Follow Up   No follow-ups on file.  Patient was given instructions and counseling regarding her condition or for health maintenance advice. Please see specific information pulled into the AVS if appropriate.     Letitia Mesa PA-C

## 2024-08-12 ENCOUNTER — TELEPHONE (OUTPATIENT)
Dept: FAMILY MEDICINE CLINIC | Facility: CLINIC | Age: 54
End: 2024-08-12
Payer: COMMERCIAL

## 2024-08-12 DIAGNOSIS — Z00.00 GENERAL MEDICAL EXAM: ICD-10-CM

## 2024-08-12 DIAGNOSIS — R73.9 HYPERGLYCEMIA: Primary | ICD-10-CM

## 2024-08-12 NOTE — TELEPHONE ENCOUNTER
Would recommend appointment to check for UTI.    I did place an order for fasting labs as requested.

## 2024-08-12 NOTE — TELEPHONE ENCOUNTER
Caller: Tania HERMOSILLO    Relationship: Self    Best call back number:      [phone] (Mobile)     Who are you requesting to speak with (clinical staff, provider,  specific staff member): CLINICAL    What was the call regarding: PRESSURE AND FREQUENCY WITH URINATION FOR ABOUT 3 WEEKS, WEIGHT GAIN, WOULD LIKE TO HAVE FASTING BLOOD WORK     PLEASE CALL

## 2024-08-14 ENCOUNTER — LAB (OUTPATIENT)
Dept: FAMILY MEDICINE CLINIC | Facility: CLINIC | Age: 54
End: 2024-08-14
Payer: COMMERCIAL

## 2024-08-14 DIAGNOSIS — R73.9 HYPERGLYCEMIA: ICD-10-CM

## 2024-08-14 DIAGNOSIS — Z00.00 GENERAL MEDICAL EXAM: ICD-10-CM

## 2024-08-14 PROCEDURE — 36415 COLL VENOUS BLD VENIPUNCTURE: CPT | Performed by: FAMILY MEDICINE

## 2024-08-15 LAB
ALBUMIN SERPL-MCNC: 4.3 G/DL (ref 3.8–4.9)
ALP SERPL-CCNC: 115 IU/L (ref 44–121)
ALT SERPL-CCNC: 8 IU/L (ref 0–32)
AST SERPL-CCNC: 12 IU/L (ref 0–40)
BASOPHILS # BLD AUTO: 0 X10E3/UL (ref 0–0.2)
BASOPHILS NFR BLD AUTO: 1 %
BILIRUB SERPL-MCNC: 0.3 MG/DL (ref 0–1.2)
BUN SERPL-MCNC: 14 MG/DL (ref 6–24)
BUN/CREAT SERPL: 15 (ref 9–23)
CALCIUM SERPL-MCNC: 9.4 MG/DL (ref 8.7–10.2)
CHLORIDE SERPL-SCNC: 105 MMOL/L (ref 96–106)
CHOLEST SERPL-MCNC: 199 MG/DL (ref 100–199)
CO2 SERPL-SCNC: 25 MMOL/L (ref 20–29)
CREAT SERPL-MCNC: 0.94 MG/DL (ref 0.57–1)
EGFRCR SERPLBLD CKD-EPI 2021: 73 ML/MIN/1.73
EOSINOPHIL # BLD AUTO: 0.1 X10E3/UL (ref 0–0.4)
EOSINOPHIL NFR BLD AUTO: 2 %
ERYTHROCYTE [DISTWIDTH] IN BLOOD BY AUTOMATED COUNT: 14.3 % (ref 11.7–15.4)
GLOBULIN SER CALC-MCNC: 2 G/DL (ref 1.5–4.5)
GLUCOSE SERPL-MCNC: 104 MG/DL (ref 70–99)
HBA1C MFR BLD: 6 % (ref 4.8–5.6)
HCT VFR BLD AUTO: 41.5 % (ref 34–46.6)
HDLC SERPL-MCNC: 84 MG/DL
HGB BLD-MCNC: 12.7 G/DL (ref 11.1–15.9)
IMM GRANULOCYTES # BLD AUTO: 0 X10E3/UL (ref 0–0.1)
IMM GRANULOCYTES NFR BLD AUTO: 0 %
LDLC SERPL CALC-MCNC: 106 MG/DL (ref 0–99)
LYMPHOCYTES # BLD AUTO: 1.9 X10E3/UL (ref 0.7–3.1)
LYMPHOCYTES NFR BLD AUTO: 34 %
MCH RBC QN AUTO: 27 PG (ref 26.6–33)
MCHC RBC AUTO-ENTMCNC: 30.6 G/DL (ref 31.5–35.7)
MCV RBC AUTO: 88 FL (ref 79–97)
MONOCYTES # BLD AUTO: 0.3 X10E3/UL (ref 0.1–0.9)
MONOCYTES NFR BLD AUTO: 6 %
NEUTROPHILS # BLD AUTO: 3.3 X10E3/UL (ref 1.4–7)
NEUTROPHILS NFR BLD AUTO: 57 %
PLATELET # BLD AUTO: 250 X10E3/UL (ref 150–450)
POTASSIUM SERPL-SCNC: 4.8 MMOL/L (ref 3.5–5.2)
PROT SERPL-MCNC: 6.3 G/DL (ref 6–8.5)
RBC # BLD AUTO: 4.71 X10E6/UL (ref 3.77–5.28)
SODIUM SERPL-SCNC: 142 MMOL/L (ref 134–144)
T4 FREE SERPL-MCNC: 1.29 NG/DL (ref 0.82–1.77)
TRIGL SERPL-MCNC: 48 MG/DL (ref 0–149)
TSH SERPL DL<=0.005 MIU/L-ACNC: 2.56 UIU/ML (ref 0.45–4.5)
VLDLC SERPL CALC-MCNC: 9 MG/DL (ref 5–40)
WBC # BLD AUTO: 5.7 X10E3/UL (ref 3.4–10.8)

## 2024-08-21 ENCOUNTER — TELEPHONE (OUTPATIENT)
Dept: FAMILY MEDICINE CLINIC | Facility: CLINIC | Age: 54
End: 2024-08-21

## 2024-08-21 ENCOUNTER — TELEMEDICINE (OUTPATIENT)
Dept: FAMILY MEDICINE CLINIC | Facility: CLINIC | Age: 54
End: 2024-08-21
Payer: COMMERCIAL

## 2024-08-21 VITALS — BODY MASS INDEX: 43.23 KG/M2 | HEIGHT: 63 IN | WEIGHT: 244 LBS

## 2024-08-21 DIAGNOSIS — R73.9 HYPERGLYCEMIA: Primary | ICD-10-CM

## 2024-08-21 DIAGNOSIS — E66.01 CLASS 3 SEVERE OBESITY DUE TO EXCESS CALORIES WITH SERIOUS COMORBIDITY AND BODY MASS INDEX (BMI) OF 40.0 TO 44.9 IN ADULT: Chronic | ICD-10-CM

## 2024-08-21 DIAGNOSIS — N95.1 MENOPAUSAL SYMPTOMS: ICD-10-CM

## 2024-08-21 DIAGNOSIS — K21.9 GERD WITHOUT ESOPHAGITIS: ICD-10-CM

## 2024-08-21 DIAGNOSIS — R00.2 FLUTTERING SENSATION OF HEART: ICD-10-CM

## 2024-08-21 DIAGNOSIS — E78.2 HYPERLIPIDEMIA, MIXED: ICD-10-CM

## 2024-08-21 DIAGNOSIS — K22.70 BARRETT'S ESOPHAGUS WITHOUT DYSPLASIA: Chronic | ICD-10-CM

## 2024-08-21 PROBLEM — F41.0 GENERALIZED ANXIETY DISORDER WITH PANIC ATTACKS: Chronic | Status: RESOLVED | Noted: 2023-07-21 | Resolved: 2024-08-21

## 2024-08-21 PROBLEM — F41.1 GENERALIZED ANXIETY DISORDER WITH PANIC ATTACKS: Chronic | Status: RESOLVED | Noted: 2023-07-21 | Resolved: 2024-08-21

## 2024-08-21 PROCEDURE — 99214 OFFICE O/P EST MOD 30 MIN: CPT | Performed by: FAMILY MEDICINE

## 2024-08-21 RX ORDER — PAROXETINE 10 MG/1
10 TABLET, FILM COATED ORAL
Qty: 90 TABLET | Refills: 1 | Status: SHIPPED | OUTPATIENT
Start: 2024-08-21

## 2024-08-21 RX ORDER — ESOMEPRAZOLE MAGNESIUM 40 MG/1
40 CAPSULE, DELAYED RELEASE ORAL
Qty: 90 CAPSULE | Refills: 1 | Status: SHIPPED | OUTPATIENT
Start: 2024-08-21

## 2024-08-21 RX ORDER — FAMOTIDINE 40 MG/1
40 TABLET, FILM COATED ORAL DAILY
Qty: 90 TABLET | Refills: 1 | Status: SHIPPED | OUTPATIENT
Start: 2024-08-21

## 2024-08-21 NOTE — ASSESSMENT & PLAN NOTE
History of heart fluttering with full neg cardiac workup at [facility] a few years ago.  No chest pain or chest pressure.  Will check Mg with next labs and discussed she can try Mg otc given her nexium can affect Mg absorption.    Understands limitations with telehealth and if worsening/new symptoms arise  advised in-office visit

## 2024-08-21 NOTE — ASSESSMENT & PLAN NOTE
Encouraged diet and exercise efforts.      Reviewed rise in A1c to 6.0    Interested in referral to wt management for wt loss tx options - referral placed today

## 2024-08-21 NOTE — ASSESSMENT & PLAN NOTE
Patient's (Body mass index is 43.22 kg/m².) indicates that they are morbidly/severely obese (BMI > 40 or > 35 with obesity - related health condition) with health conditions that include GERD   . Weight is improving with treatment. BMI  is above average; BMI management plan is completed. We discussed portion control and increasing exercise.     Scheduling consultation with wt management

## 2024-08-21 NOTE — PROGRESS NOTES
Patient was seen today through synchronous audio/video technology. Verbal consent was obtained. The patient was located at home. Vitals signs were obtained by patient and recorded.     I was located at our [facility] office for this telehealth visit.    Chief Complaint  Menopausal symptoms, problems with wt loss/obesity, GERD/Velasquez's history    History of Present Illness  Tania HERMOSILLO is a 54 y.o. female presenting via video-conference today for followup visit and medication refills    Still struggling with weight issues.  Had significant side-effects with compounded semiglutide.    Did have cholecystectomy since our last visit - interested in referral to wt management clinic.  Did well with compounded mounjaro in the past and would like to discuss restarting    Off wellbutrinXL given it didn't help appetite.    Ongoing menopausal symptoms - would like to try paxil low-dose at bedtime    GERD/Velasquez's history - doing well with nexium and pepcid.  Cholecystectomy also helped GI issues    A1c up with most recent labs and is working on diet/exercise    History of heart fluttering with full neg cardiac workup at [facility] a few years ago.  No chest pain or chest pressure.  Will check Mg with next labs and discussed she can try Mg otc given her nexium can affect Mg absorption.            The following portions of the patient's history were reviewed and updated as appropriate: allergies, current medications, past family history, past medical history, past social history, past surgical history and problem list.    Review of Systems   Constitutional:  Negative for appetite change, chills, fever and unexpected weight change.   HENT:  Negative for hearing loss.    Eyes:  Negative for visual disturbance.   Respiratory:  Negative for chest tightness, shortness of breath and wheezing.    Cardiovascular:  Positive for palpitations. Negative for chest pain and leg swelling.   Gastrointestinal:   "Negative for abdominal pain.   Endocrine:        Menopausal symptoms, see HPI   Musculoskeletal:  Negative for arthralgias, back pain and gait problem.   Skin:  Negative for rash.   Neurological:  Negative for dizziness and headaches.   Psychiatric/Behavioral:  Negative for agitation and confusion. The patient is not nervous/anxious.        Objective  Ht 160 cm (63\")   Wt 111 kg (244 lb)   BMI 43.22 kg/m²     Physical Exam  Constitutional:       General: She is not in acute distress.     Appearance: She is obese. She is not ill-appearing.   HENT:      Head: Normocephalic and atraumatic.      Right Ear: External ear normal.      Left Ear: External ear normal.      Nose: Nose normal.   Eyes:      Extraocular Movements: Extraocular movements intact.      Conjunctiva/sclera: Conjunctivae normal.      Pupils: Pupils are equal, round, and reactive to light.   Pulmonary:      Effort: Pulmonary effort is normal. No respiratory distress.   Musculoskeletal:         General: Normal range of motion.      Cervical back: Normal range of motion.   Skin:     Findings: No rash.   Neurological:      General: No focal deficit present.      Mental Status: She is alert and oriented to person, place, and time.   Psychiatric:         Mood and Affect: Mood normal.         Behavior: Behavior normal.         Thought Content: Thought content normal.           Assessment/Plan   Diagnoses and all orders for this visit:    1. Hyperglycemia (Primary)  Assessment & Plan:  Encouraged diet and exercise efforts.      Reviewed rise in A1c to 6.0    Interested in referral to wt management for wt loss tx options - referral placed today     Orders:  -     Hemoglobin A1c; Future    2. GERD without esophagitis  Assessment & Plan:  Refilled Pepcid and Nexium.  No dysphagia    Orders:  -     esomeprazole (nexIUM) 40 MG capsule; Take 1 capsule by mouth Every Morning Before Breakfast. BIOPSY PROVEN MILES'S ESOPHAGUS.  Dispense: 90 capsule; Refill: 1  -     " famotidine (PEPCID) 40 MG tablet; Take 1 tablet by mouth Daily.  Dispense: 90 tablet; Refill: 1    3. Miles's esophagus without dysplasia  Assessment & Plan:  Continue Nexium and pepcid    Orders:  -     esomeprazole (nexIUM) 40 MG capsule; Take 1 capsule by mouth Every Morning Before Breakfast. BIOPSY PROVEN MILES'S ESOPHAGUS.  Dispense: 90 capsule; Refill: 1  -     famotidine (PEPCID) 40 MG tablet; Take 1 tablet by mouth Daily.  Dispense: 90 tablet; Refill: 1    4. Menopausal symptoms  Assessment & Plan:  Tx options discussed    Hx of breast cancer so discussed no hormones    Given trial with low-dose paxil at bedtime    Orders:  -     PARoxetine (Paxil) 10 MG tablet; Take 1 tablet by mouth every night at bedtime. For menopausal symptoms  Dispense: 90 tablet; Refill: 1    5. Fluttering sensation of heart  Assessment & Plan:  History of heart fluttering with full neg cardiac workup at [facility] a few years ago.  No chest pain or chest pressure.  Will check Mg with next labs and discussed she can try Mg otc given her nexium can affect Mg absorption.    Understands limitations with telehealth and if worsening/new symptoms arise  advised in-office visit    Orders:  -     TSH; Future  -     T4, Free; Future  -     Magnesium; Future    6. Hyperlipidemia, mixed  Assessment & Plan:  Mild LDL elevation    Working on diet/exercise and wt loss     Orders:  -     Comprehensive Metabolic Panel; Future  -     Lipid Panel; Future  -     TSH; Future  -     T4, Free; Future    7. Class 3 severe obesity due to excess calories with serious comorbidity and body mass index (BMI) of 40.0 to 44.9 in adult  Assessment & Plan:  Patient's (Body mass index is 43.22 kg/m².) indicates that they are morbidly/severely obese (BMI > 40 or > 35 with obesity - related health condition) with health conditions that include GERD   . Weight is improving with treatment. BMI  is above average; BMI management plan is completed. We discussed portion control  and increasing exercise.     Scheduling consultation with wt management    Orders:  -     Ambulatory Referral to Weight Management Program                Return in about 6 months (around 2/21/2025) for Med recheck.    Hung Castillo MD

## 2024-08-21 NOTE — TELEPHONE ENCOUNTER
Could you setup a 6 month followup in-office and fasting labs the week before! thanks  4 mins  MB  DONE

## 2024-08-21 NOTE — ASSESSMENT & PLAN NOTE
Tx options discussed    Hx of breast cancer so discussed no hormones    Given trial with low-dose paxil at bedtime

## 2024-10-21 ENCOUNTER — OFFICE VISIT (OUTPATIENT)
Age: 54
End: 2024-10-21
Payer: COMMERCIAL

## 2024-10-21 VITALS
BODY MASS INDEX: 42.79 KG/M2 | SYSTOLIC BLOOD PRESSURE: 126 MMHG | DIASTOLIC BLOOD PRESSURE: 78 MMHG | WEIGHT: 241.5 LBS | HEART RATE: 77 BPM | HEIGHT: 63 IN

## 2024-10-21 DIAGNOSIS — E66.01 CLASS 3 SEVERE OBESITY DUE TO EXCESS CALORIES WITH SERIOUS COMORBIDITY AND BODY MASS INDEX (BMI) OF 40.0 TO 44.9 IN ADULT: Primary | Chronic | ICD-10-CM

## 2024-10-21 DIAGNOSIS — E66.813 CLASS 3 SEVERE OBESITY DUE TO EXCESS CALORIES WITH SERIOUS COMORBIDITY AND BODY MASS INDEX (BMI) OF 40.0 TO 44.9 IN ADULT: Primary | Chronic | ICD-10-CM

## 2024-10-21 PROCEDURE — 99205 OFFICE O/P NEW HI 60 MIN: CPT | Performed by: NURSE PRACTITIONER

## 2024-10-21 RX ORDER — SEMAGLUTIDE 0.25 MG/.5ML
0.25 INJECTION, SOLUTION SUBCUTANEOUS WEEKLY
Qty: 2 ML | Refills: 0 | Status: SHIPPED | OUTPATIENT
Start: 2024-10-21 | End: 2024-11-20

## 2024-10-21 NOTE — PROGRESS NOTES
[facility]  [facility]  [address] [place] [address]              DESIREE Rojo [id number]      Date: 10/21/2024  Patient Name: Tania HERMOSILLO  MRN: [id number]  : 1970    Subjective     Chief Complaint  Obesity Management consult, nutrition counseling       History of Present Illness:  Tania HERMOSILLO presents to [facility] for obesity management. She started struggling with weight after her hysterectomy about 5 years ago. Hx of breast cancer 12 years ago.    She has taken several weight loss medications in the past including compounded semaglutide which she had side effects of n/v and RUQ abdominal pain. However, she was found later to have gallbladder disease and this may have been the etiology of her symptoms. She is s/p cholecystectomy and has since taken Mounjaro which did not cause side effects. (See below for complete list of all past weight loss medications).      Weight history:  Highest lifetime weight: 241 pounds. Today's weight is 110 kg (241 lb 8 oz) pounds.   Weight 5 years ago: 200's    Current lifestyle:   The following seem to sabotage weight loss efforts:Lack of time for planning & self, skipping meals, specific cravings like carbohydrates, boredom eating, and too little protein    Past diets: none  Past weight loss medications: metformin (made her very sick), Wellbutrin (did not help appetite), compounded semaglutide (side effects; n/v stomach pain RUQ), Saxenda (samples only; did not make sick and helped with weight), Mounjaro (samples only; only took x 1 month, did not make sick), phentermine (worked well)    24 hour Diet recall:  Breakfast: skipped  Lunch: [employer] fish and fries  Dinner: skipped  Snacks: Birthday cake  Beverages: water, coffee    Pertinent medical history:  Pancreatitis: no  Glaucoma: no  Headaches: no  HTN: no  Heart palpitations: yes, not as often since discontinuing Wellbutrin  Thyroid C cell cancer personal or family hx: no  MEN  "syndrome personal or family hx: no  Nephrolithiasis: no    PHQ-9 Total Score: 5               Objective     Body mass index is 43.47 kg/m².   Body composition analysis completed and showed:   Body Fat %: 55.3     Measurements (in inches)  Measurements (in inches) Waist Circumference: 51   Neck: 15.5  Chest: 46.5  Hips: 53  Thighs: 43    Vital Signs:   /78 (BP Location: Left arm, Patient Position: Sitting)   Pulse 77   Ht 158.8 cm (62.5\")   Wt 110 kg (241 lb 8 oz)   BMI 43.47 kg/m²     Physical Exam  Constitutional:       Appearance: Normal appearance.   HENT:      Head: Normocephalic and atraumatic.   Pulmonary:      Effort: Pulmonary effort is normal.   Neurological:      Mental Status: She is alert and oriented to person, place, and time.   Psychiatric:         Mood and Affect: Mood normal.         Thought Content: Thought content normal.          Result Review :     Common labs          3/25/2024    15:03 8/14/2024    08:13   Common Labs   Glucose 127  104    BUN 17  14    Creatinine 0.89  0.94    Sodium 142  142    Potassium 4.8  4.8    Chloride 103  105    Calcium 9.8  9.4    Total Protein 6.9  6.3    Albumin 4.8  4.3    Total Bilirubin 0.2  0.3    Alkaline Phosphatase 155  115    AST (SGOT) 9  12    ALT (SGPT) 11  8    WBC 6.4  5.7    Hemoglobin 12.7  12.7    Hematocrit 39.5  41.5    Platelets 266  250    Total Cholesterol  199    Triglycerides  48    HDL Cholesterol  84    LDL Cholesterol   106    Hemoglobin A1C 5.7  6.0                  Assessment / Plan       Diagnoses and all orders for this visit:    1. Class 3 severe obesity due to excess calories with serious comorbidity and body mass index (BMI) of 40.0 to 44.9 in adult (Primary)  Assessment & Plan:  Patient's (Body mass index is 43.47 kg/m².) indicates that they are morbidly/severely obese (BMI > 40 or > 35 with obesity - related health condition) with health conditions that include GERD . Weight is newly identified. BMI  is above average; " BMI management plan is completed. We discussed low calorie, low carb based diet program, portion control, increasing exercise, and an mickey-based approach such as [email or web address] Pal or Lose It.       -- This is an initial visit. Topics of discussion included obesity as a disease, nutritional education on food groups, exercise, and medications.Patient's past medical history was reviewed in detail and barriers to weight loss were identified and discussed. Past efforts at weight reduction on their own as well as under physician supervision were documented and discussed.  I advised patient to continue routine care with their Primary Care Provider.  --Body composition analysis was reviewed. Short and long-term weight loss goals set up based on this information.  --Patient was instructed on adequate protein, controlled carb and controlled fat intake. Baritastic food journal set up with calorie and macro goals set : calories - 1300 or less, protein - 100 g/day or more , net carbs - 75 g/day or less  .  Patient encouraged to start journaling daily.  Encouraged that we are not necessarily looking for perfection but starting to learn where calories and macros are staying.  Patient advised that were looking to stay at or below calorie and carbohydrate levels and at or above protein and fiber levels. Asked patient to bring in food journal to next office visit for brief review  --Patient is to try nutritonal/behavioral changes.  --Fasting labs reviewed from 8/14/24  --Begin compounded semaglutide. Dosing instructions, adverse effects and side effects discussed with patient  Discussed possibility of previous side effect may have been gallbladder symptoms but if semaglutide does cause these side effects again will change to tirzepatide        Orders:  -     Semaglutide-Weight Management (Wegovy) 0.25 MG/0.5ML solution auto-injector; Inject 0.5 mL under the skin into the appropriate area as directed 1 (One) Time Per Week for 30 days.   Dispense: 2 mL; Refill: 0        I spent 64 minutes caring for Tania on this date of service. This time includes time spent by me in the following activities:preparing for the visit, reviewing tests, obtaining and/or reviewing a separately obtained history, counseling and educating the patient/family/caregiver, and documenting information in the medical record  Follow Up   Return in about 4 weeks (around 11/18/2024).  Patient was given instructions and counseling regarding her condition or for health maintenance advice. Please see specific information pulled into the AVS if appropriate.     Mayank Key, APRN  10/21/2024

## 2024-10-21 NOTE — ASSESSMENT & PLAN NOTE
Patient's (Body mass index is 43.47 kg/m².) indicates that they are morbidly/severely obese (BMI > 40 or > 35 with obesity - related health condition) with health conditions that include GERD . Weight is newly identified. BMI  is above average; BMI management plan is completed. We discussed low calorie, low carb based diet program, portion control, increasing exercise, and an mickey-based approach such as [employer] Pal or Lose It.       -- This is an initial visit. Topics of discussion included obesity as a disease, nutritional education on food groups, exercise, and medications.Patient's past medical history was reviewed in detail and barriers to weight loss were identified and discussed. Past efforts at weight reduction on their own as well as under physician supervision were documented and discussed.  I advised patient to continue routine care with their Primary Care Provider.  --Body composition analysis was reviewed. Short and long-term weight loss goals set up based on this information.  --Patient was instructed on adequate protein, controlled carb and controlled fat intake. Baritastic food journal set up with calorie and macro goals set : calories - 1300 or less, protein - 100 g/day or more , net carbs - 75 g/day or less  .  Patient encouraged to start journaling daily.  Encouraged that we are not necessarily looking for perfection but starting to learn where calories and macros are staying.  Patient advised that were looking to stay at or below calorie and carbohydrate levels and at or above protein and fiber levels. Asked patient to bring in food journal to next office visit for brief review  --Patient is to try nutritonal/behavioral changes.  --Fasting labs reviewed from 8/14/24  --Begin compounded semaglutide. Dosing instructions, adverse effects and side effects discussed with patient  Discussed possibility of previous side effect may have been gallbladder symptoms but if semaglutide does cause these side  effects again will change to tirzepatide

## 2024-10-28 ENCOUNTER — PATIENT ROUNDING (BHMG ONLY) (OUTPATIENT)
Dept: BARIATRICS/WEIGHT MGMT | Facility: CLINIC | Age: 54
End: 2024-10-28
Payer: COMMERCIAL

## 2024-10-28 NOTE — PROGRESS NOTES
A [employer] message has been sent to the patient for PATIENT ROUNDING for [facility].

## 2024-11-21 ENCOUNTER — OFFICE VISIT (OUTPATIENT)
Age: 54
End: 2024-11-21
Payer: COMMERCIAL

## 2024-11-21 VITALS
BODY MASS INDEX: 41.53 KG/M2 | HEIGHT: 63 IN | DIASTOLIC BLOOD PRESSURE: 82 MMHG | SYSTOLIC BLOOD PRESSURE: 142 MMHG | WEIGHT: 234.4 LBS | HEART RATE: 77 BPM

## 2024-11-21 DIAGNOSIS — E66.01 CLASS 3 SEVERE OBESITY DUE TO EXCESS CALORIES WITH SERIOUS COMORBIDITY AND BODY MASS INDEX (BMI) OF 40.0 TO 44.9 IN ADULT: Primary | Chronic | ICD-10-CM

## 2024-11-21 DIAGNOSIS — E66.813 CLASS 3 SEVERE OBESITY DUE TO EXCESS CALORIES WITH SERIOUS COMORBIDITY AND BODY MASS INDEX (BMI) OF 40.0 TO 44.9 IN ADULT: Primary | Chronic | ICD-10-CM

## 2024-11-21 NOTE — ASSESSMENT & PLAN NOTE
Patient's (Body mass index is 42.19 kg/m².) indicates that they are morbidly/severely obese (BMI > 40 or > 35 with obesity - related health condition) with health conditions that include GERD . Weight is improving with lifestyle modifications. BMI  is above average; BMI management plan is completed. We discussed low calorie, low carb based diet program, portion control, increasing exercise, and pharmacologic options including tirzepatide .     This is a follow up visit. Weight loss of 7.4 pounds in the past month. Body composition reflective of 10.2# fat mass loss and 2.8# fat free mass gain.    I have instructed the patient to continue with pursuit of medical weight loss as a part of this program. Patient does meet criteria for use of anorectics at this time as BMI > 27 with coexisting.     The current plan for this month includes:   - It is appropriate to continue anorectic medications as prescribed at this time  >>Discontinue semaglutide (done). Patient would like to try tirzepatide, she has taken this in the past and it did not cause her GI side effects. I have reviewed with her that the side effect profile is the same as semaglutide, causing gastroparesis and she verbalized understanding.  - Increase water to recommended daily amount  - Weight loss goal 4-6lbs this month  - Continue to prioritize protein, fiber, and hydration.

## 2024-11-21 NOTE — PROGRESS NOTES
[facility]  [address] [place] [address]  DESIREE Rojo [id number]    Office Note Follow Up      Date: 2024  Patient Name: Tania HERMOSILLO  MRN: [id number]  : 1970    Subjective     Chief Complaint  Obesity Management follow-up    Tania HERMOSILLO presents to [facility] for obesity management.     Patient is unsatisfied with weight loss progress. Appetite is well controlled. Reports side effects of nausea, sulfa burps, indigestion from prescribed medications, semaglutide so she only took this for 2 weeks and then discontinued. She has been focusing on her nutrition and lost 7.4 pounds this month.  The patient is taking multivitamin and is taking fish oil.  The patient is not using a food journal, she did use a journal for about a week but gets busy and she forgets to journal.  The patient is exercising with a FITT score of:    Frequency Intensity Time Strength Training   []   0, none []   0 []   0 [x]   0   [x]   1 (1-2x/week) [x]   1 (light) [x]   1 (<10 min) []   1 (1x/week)   []   2 (3-5x/week) []   2 (moderate) []   2 (10-20 min) []   2 (2x/week)   []   3 (daily) []   3 (moderately hard)  []   4 (very hard) []   3 (20-30 min)  []   4 (>30 min) []   3 (3-4x/week)     Review of Systems   Constitutional:  Negative for appetite change and fatigue.   Eyes:  Negative for visual disturbance.   Cardiovascular:  Negative for chest pain and palpitations.   Gastrointestinal:  Negative for constipation and indigestion.   Neurological:  Negative for light-headedness.   All other systems reviewed and are negative.      Objective   Start weight: 241.8 pounds.    Total Loss lb/%Loss of beginning body weight (BBW): -7.4 lb/-3.06 %  Change in weight since last visit: -7.4     Recent Weight History:   Wt Readings from Last 6 Encounters:   24 106 kg (234 lb 6.4 oz)   10/21/24 110 kg (241 lb 8 oz)   24 111 kg (244 lb)   24 134 kg (296 lb)   24 108 kg (239  "lb)   06/29/24 108 kg (239 lb)         Body mass index is 42.19 kg/m².   Body composition analysis completed and showed:   Body Fat %: 52.7    Measurements (in inches)  Waist Circumference: 49    Vital Signs:   /82 (BP Location: Right arm, Patient Position: Sitting)   Pulse 77   Ht 158.8 cm (62.5\")   Wt 106 kg (234 lb 6.4 oz)   BMI 42.19 kg/m²       Physical Exam  Constitutional:       Appearance: Normal appearance.   HENT:      Head: Normocephalic and atraumatic.   Pulmonary:      Effort: Pulmonary effort is normal.   Neurological:      Mental Status: She is alert and oriented to person, place, and time.   Psychiatric:         Mood and Affect: Mood normal.         Thought Content: Thought content normal.               Assessment / Plan        Diagnoses and all orders for this visit:    1. Class 3 severe obesity due to excess calories with serious comorbidity and body mass index (BMI) of 40.0 to 44.9 in adult (Primary)  Assessment & Plan:  Patient's (Body mass index is 42.19 kg/m².) indicates that they are morbidly/severely obese (BMI > 40 or > 35 with obesity - related health condition) with health conditions that include GERD . Weight is improving with lifestyle modifications. BMI  is above average; BMI management plan is completed. We discussed low calorie, low carb based diet program, portion control, increasing exercise, and pharmacologic options including tirzepatide .     This is a follow up visit. Weight loss of 7.4 pounds in the past month. Body composition reflective of 10.2# fat mass loss and 2.8# fat free mass gain.    I have instructed the patient to continue with pursuit of medical weight loss as a part of this program. Patient does meet criteria for use of anorectics at this time as BMI > 27 with coexisting.     The current plan for this month includes:   - It is appropriate to continue anorectic medications as prescribed at this time  >>Discontinue semaglutide (done). Patient would like to " try tirzepatide, she has taken this in the past and it did not cause her GI side effects. I have reviewed with her that the side effect profile is the same as semaglutide, causing gastroparesis and she verbalized understanding.  - Increase water to recommended daily amount  - Weight loss goal 4-6lbs this month  - Continue to prioritize protein, fiber, and hydration.         Orders:  -     Tirzepatide-Weight Management (ZEPBOUND) 2.5 MG/0.5ML solution auto-injector; Inject 0.5 mL under the skin into the appropriate area as directed 1 (One) Time Per Week.  Dispense: 2 mL; Refill: 0        We discussed the risks, benefits, and limitations of treatments. Continue medications and OTC supplements as discussed. Patient verbalizes understanding of and agreement with management plan.     Follow Up   Return in about 4 weeks (around 12/19/2024).  Patient was given instructions and counseling regarding her condition or for health maintenance advice. Please see specific information pulled into the AVS if appropriate.     I spent 40 minutes on this date of service. This time includes time spent by me in the following activities:preparing for the visit, counseling and educating the patient/family/caregiver, ordering medications, tests, or procedures and documenting information in the medical record.    Mayank Key, APRN  11/21/2024

## 2025-01-15 ENCOUNTER — TELEPHONE (OUTPATIENT)
Dept: FAMILY MEDICINE CLINIC | Facility: CLINIC | Age: 55
End: 2025-01-15
Payer: COMMERCIAL

## 2025-01-15 NOTE — TELEPHONE ENCOUNTER
Caller: Tania HERMOSILLO    Relationship to patient: Self    Best call back number: [phone]     Chief complaint: CLOGGED AND POPPING EAR'S WITH HEADACHE     Type of visit: SAME DAY     Requested date: 01/15/2025      Additional notes:  PATIENT WOULD LIKE TO BE SEEN IN THE OFFICE TODAY 01/15/2025 BY ANY PCP FOR LISTED ABOVE CHIEF COMPLAINT     UNABLE TO WARM TRANSFER CALL TO THE OFFICE        PROVIDER:[TOKEN:[[id number]],FOLLOWUP:[7-10 Days]]

## 2025-01-16 ENCOUNTER — OFFICE VISIT (OUTPATIENT)
Dept: FAMILY MEDICINE CLINIC | Facility: CLINIC | Age: 55
End: 2025-01-16
Payer: COMMERCIAL

## 2025-01-16 VITALS
DIASTOLIC BLOOD PRESSURE: 88 MMHG | BODY MASS INDEX: 43.12 KG/M2 | OXYGEN SATURATION: 98 % | WEIGHT: 243.4 LBS | HEART RATE: 89 BPM | HEIGHT: 63 IN | SYSTOLIC BLOOD PRESSURE: 142 MMHG

## 2025-01-16 DIAGNOSIS — E66.813 CLASS 3 SEVERE OBESITY DUE TO EXCESS CALORIES WITH SERIOUS COMORBIDITY AND BODY MASS INDEX (BMI) OF 40.0 TO 44.9 IN ADULT: Chronic | ICD-10-CM

## 2025-01-16 DIAGNOSIS — H65.03 NON-RECURRENT ACUTE SEROUS OTITIS MEDIA OF BOTH EARS: ICD-10-CM

## 2025-01-16 DIAGNOSIS — E66.01 CLASS 3 SEVERE OBESITY DUE TO EXCESS CALORIES WITH SERIOUS COMORBIDITY AND BODY MASS INDEX (BMI) OF 40.0 TO 44.9 IN ADULT: Chronic | ICD-10-CM

## 2025-01-16 DIAGNOSIS — H92.03 OTALGIA OF BOTH EARS: Primary | ICD-10-CM

## 2025-01-16 DIAGNOSIS — R03.0 ELEVATED BP WITHOUT DIAGNOSIS OF HYPERTENSION: ICD-10-CM

## 2025-01-16 DIAGNOSIS — H69.93 DYSFUNCTION OF BOTH EUSTACHIAN TUBES: ICD-10-CM

## 2025-01-16 PROCEDURE — 99214 OFFICE O/P EST MOD 30 MIN: CPT | Performed by: FAMILY MEDICINE

## 2025-01-16 RX ORDER — CEFDINIR 300 MG/1
300 CAPSULE ORAL 2 TIMES DAILY
Qty: 14 CAPSULE | Refills: 0 | Status: SHIPPED | OUTPATIENT
Start: 2025-01-16 | End: 2025-01-23

## 2025-01-16 RX ORDER — ETODOLAC 500 MG/1
TABLET, FILM COATED ORAL SEE ADMIN INSTRUCTIONS
COMMUNITY
Start: 2024-12-26

## 2025-01-16 RX ORDER — METHYLPREDNISOLONE 4 MG/1
TABLET ORAL
Qty: 21 TABLET | Refills: 0 | Status: SHIPPED | OUTPATIENT
Start: 2025-01-16

## 2025-01-16 NOTE — ASSESSMENT & PLAN NOTE
Patient's (Body mass index is 43.81 kg/m².) indicates that they are morbidly/severely obese (BMI > 40 or > 35 with obesity - related health condition) with health conditions that include GERD   . Weight is improving with treatment. BMI  is above average; BMI management plan is completed. We discussed portion control and increasing exercise.     Failed diet/exercise and wegovy with wt management    Rx sent in for [facility] to help

## 2025-01-16 NOTE — ASSESSMENT & PLAN NOTE
Tms with scarring and retracted with sm-mod serous effusion    Tx with medrol dosepak and ongoing flonase    If worsening symptoms with fever/pain/facial pain may fill and take omnicef but at this time no evid for bacterial infection    Recheck at followup next month for physical

## 2025-01-16 NOTE — PROGRESS NOTES
"Chief Complaint  Earache (Patient reports ear fullness and pressure x 3 weeks )    Subjective    History of Present Illness:  Tania HERMOSILLO is a 55 y.o. female who presents today for bilateral ear fullness and pain.  Seen at urgent care end of Dec after trip to [place].  Neg Flu and COVID testing.  Tx with zpak and steroid shot.    Has been using flonase and antihistamines.    L>R but both have been increasingly bothersome    Would like to see if insurance will cover zepbound this yr - failed diet/exercise efforts          Objective   Vital Signs:   /88 (BP Location: Left arm, Patient Position: Sitting, Cuff Size: Large Adult)   Pulse 89   Ht 158.8 cm (62.5\")   Wt 110 kg (243 lb 6.4 oz)   SpO2 98%   BMI 43.81 kg/m²     Review of Systems   Constitutional:  Negative for appetite change, chills and fever.   HENT:  Positive for congestion, hearing loss, postnasal drip and sinus pressure. Negative for ear discharge.         Bilat ear fullness/pressure    Eyes:  Negative for blurred vision.   Respiratory:  Negative for chest tightness.    Cardiovascular:  Negative for chest pain.   Gastrointestinal:  Negative for abdominal pain.   Musculoskeletal:  Negative for gait problem.   Skin:  Negative for rash.   Psychiatric/Behavioral:  Negative for depressed mood.        Past History:  Medical History: has a past medical history of Anxiety, Cancer (2012), Depression, GERD (gastroesophageal reflux disease), and Obesity.   Surgical History: has a past surgical history that includes Breast surgery (2012); Cosmetic surgery (2024); and Hysterectomy (2018).   Family History: family history includes Cancer in her maternal grandmother; Diabetes in her father, mother, and sister; Heart disease in her maternal grandmother; Obesity in her maternal uncle and mother.   Social History: reports that she is a non-smoker but has been exposed to tobacco smoke. She has never used smokeless tobacco. She reports that she does not currently use " "alcohol. She reports that she does not use drugs.      Current Outpatient Medications:     esomeprazole (nexIUM) 40 MG capsule, Take 1 capsule by mouth Every Morning Before Breakfast. BIOPSY PROVEN MILES'S ESOPHAGUS., Disp: 90 capsule, Rfl: 1    famotidine (PEPCID) 40 MG tablet, Take 1 tablet by mouth Daily., Disp: 90 tablet, Rfl: 1    PARoxetine (Paxil) 10 MG tablet, Take 1 tablet by mouth every night at bedtime. For menopausal symptoms, Disp: 90 tablet, Rfl: 1    Sure Comfort Insulin Syringe 31G X 5/16\" 0.5 ML misc, See Admin Instructions., Disp: , Rfl:     Tirzepatide-Weight Management (ZEPBOUND) 2.5 MG/0.5ML solution auto-injector, Inject 0.5 mL under the skin into the appropriate area as directed 1 (One) Time Per Week., Disp: 2 mL, Rfl: 3    cefdinir (OMNICEF) 300 MG capsule, Take 1 capsule by mouth 2 (Two) Times a Day for 7 days., Disp: 14 capsule, Rfl: 0    methylPREDNISolone (MEDROL) 4 MG dose pack, Take as directed on package instructions., Disp: 21 tablet, Rfl: 0    Allergies: Patient has no known allergies.    Physical Exam  Constitutional:       Appearance: She is obese.   HENT:      Head: Normocephalic.      Comments: Bilateral TM retraction and scarring.  Mild-mod serous effusion.  No TM perforation     Right Ear: Ear canal and external ear normal. There is no impacted cerumen.      Left Ear: Ear canal and external ear normal. There is no impacted cerumen.      Nose: Congestion present.      Mouth/Throat:      Mouth: Mucous membranes are moist.      Pharynx: Oropharynx is clear. No oropharyngeal exudate or posterior oropharyngeal erythema.   Eyes:      Pupils: Pupils are equal, round, and reactive to light.   Cardiovascular:      Rate and Rhythm: Normal rate and regular rhythm.      Heart sounds: Normal heart sounds.   Pulmonary:      Effort: Pulmonary effort is normal.      Breath sounds: Normal breath sounds.   Musculoskeletal:         General: Normal range of motion.      Cervical back: Normal " range of motion.   Skin:     General: Skin is warm and dry.   Neurological:      General: No focal deficit present.      Mental Status: She is alert.   Psychiatric:         Mood and Affect: Mood normal.         Behavior: Behavior normal.         Thought Content: Thought content normal.          Result Review                   Assessment and Plan  Diagnoses and all orders for this visit:    1. Otalgia of both ears (Primary)  Assessment & Plan:  Tms with scarring and retracted with sm-mod serous effusion    Tx with medrol dosepak and ongoing flonase    If worsening symptoms with fever/pain/facial pain may fill and take omnicef but at this time no evid for bacterial infection    Recheck at followup next month for physical     Orders:  -     methylPREDNISolone (MEDROL) 4 MG dose pack; Take as directed on package instructions.  Dispense: 21 tablet; Refill: 0    2. Dysfunction of both eustachian tubes  Assessment & Plan:  See above    Orders:  -     methylPREDNISolone (MEDROL) 4 MG dose pack; Take as directed on package instructions.  Dispense: 21 tablet; Refill: 0    3. Non-recurrent acute serous otitis media of both ears  Assessment & Plan:  See above    Orders:  -     methylPREDNISolone (MEDROL) 4 MG dose pack; Take as directed on package instructions.  Dispense: 21 tablet; Refill: 0  -     cefdinir (OMNICEF) 300 MG capsule; Take 1 capsule by mouth 2 (Two) Times a Day for 7 days.  Dispense: 14 capsule; Refill: 0    4. Elevated BP without diagnosis of hypertension  Assessment & Plan:  Encouraged home bp checks  Recheck at physical next month       5. Class 3 severe obesity due to excess calories with serious comorbidity and body mass index (BMI) of 40.0 to 44.9 in adult  Assessment & Plan:  Patient's (Body mass index is 43.81 kg/m².) indicates that they are morbidly/severely obese (BMI > 40 or > 35 with obesity - related health condition) with health conditions that include GERD   . Weight is improving with treatment.  BMI  is above average; BMI management plan is completed. We discussed portion control and increasing exercise.     Failed diet/exercise and wegovy with wt management    Rx sent in for zepbound to help    Orders:  -     Tirzepatide-Weight Management (ZEPBOUND) 2.5 MG/0.5ML solution auto-injector; Inject 0.5 mL under the skin into the appropriate area as directed 1 (One) Time Per Week.  Dispense: 2 mL; Refill: 3                   Follow Up  Return in about 5 weeks (around 2/21/2025) for Med recheck.    Hung Castillo MD

## 2025-01-22 ENCOUNTER — TELEPHONE (OUTPATIENT)
Dept: FAMILY MEDICINE CLINIC | Facility: CLINIC | Age: 55
End: 2025-01-22
Payer: COMMERCIAL

## 2025-02-17 ENCOUNTER — LAB (OUTPATIENT)
Dept: FAMILY MEDICINE CLINIC | Facility: CLINIC | Age: 55
End: 2025-02-17
Payer: COMMERCIAL

## 2025-02-17 DIAGNOSIS — E78.2 HYPERLIPIDEMIA, MIXED: ICD-10-CM

## 2025-02-17 DIAGNOSIS — R73.9 HYPERGLYCEMIA: ICD-10-CM

## 2025-02-17 DIAGNOSIS — R00.2 FLUTTERING SENSATION OF HEART: ICD-10-CM

## 2025-02-18 LAB
ALBUMIN SERPL-MCNC: 4.6 G/DL (ref 3.8–4.9)
ALP SERPL-CCNC: 113 IU/L (ref 44–121)
ALT SERPL-CCNC: 8 IU/L (ref 0–32)
AST SERPL-CCNC: 8 IU/L (ref 0–40)
BILIRUB SERPL-MCNC: 0.2 MG/DL (ref 0–1.2)
BUN SERPL-MCNC: 15 MG/DL (ref 6–24)
BUN/CREAT SERPL: 19 (ref 9–23)
CALCIUM SERPL-MCNC: 9.2 MG/DL (ref 8.7–10.2)
CHLORIDE SERPL-SCNC: 107 MMOL/L (ref 96–106)
CHOLEST SERPL-MCNC: 172 MG/DL (ref 100–199)
CO2 SERPL-SCNC: 25 MMOL/L (ref 20–29)
CREAT SERPL-MCNC: 0.81 MG/DL (ref 0.57–1)
EGFRCR SERPLBLD CKD-EPI 2021: 86 ML/MIN/1.73
GLOBULIN SER CALC-MCNC: 1.8 G/DL (ref 1.5–4.5)
GLUCOSE SERPL-MCNC: 100 MG/DL (ref 70–99)
HBA1C MFR BLD: 5.8 % (ref 4.8–5.6)
HDLC SERPL-MCNC: 69 MG/DL
LDLC SERPL CALC-MCNC: 85 MG/DL (ref 0–99)
MAGNESIUM SERPL-MCNC: 2.2 MG/DL (ref 1.6–2.3)
POTASSIUM SERPL-SCNC: 4.4 MMOL/L (ref 3.5–5.2)
PROT SERPL-MCNC: 6.4 G/DL (ref 6–8.5)
SODIUM SERPL-SCNC: 144 MMOL/L (ref 134–144)
T4 FREE SERPL-MCNC: 1.26 NG/DL (ref 0.82–1.77)
TRIGL SERPL-MCNC: 103 MG/DL (ref 0–149)
TSH SERPL DL<=0.005 MIU/L-ACNC: 2.44 UIU/ML (ref 0.45–4.5)
VLDLC SERPL CALC-MCNC: 18 MG/DL (ref 5–40)

## 2025-02-21 ENCOUNTER — OFFICE VISIT (OUTPATIENT)
Dept: FAMILY MEDICINE CLINIC | Facility: CLINIC | Age: 55
End: 2025-02-21
Payer: COMMERCIAL

## 2025-02-21 ENCOUNTER — PATIENT MESSAGE (OUTPATIENT)
Dept: FAMILY MEDICINE CLINIC | Facility: CLINIC | Age: 55
End: 2025-02-21

## 2025-02-21 VITALS
DIASTOLIC BLOOD PRESSURE: 86 MMHG | OXYGEN SATURATION: 98 % | BODY MASS INDEX: 41.53 KG/M2 | HEIGHT: 63 IN | HEART RATE: 84 BPM | SYSTOLIC BLOOD PRESSURE: 132 MMHG | WEIGHT: 234.4 LBS

## 2025-02-21 DIAGNOSIS — Z90.13 H/O BILATERAL MASTECTOMY: Chronic | ICD-10-CM

## 2025-02-21 DIAGNOSIS — N95.1 MENOPAUSAL SYMPTOMS: ICD-10-CM

## 2025-02-21 DIAGNOSIS — R73.9 HYPERGLYCEMIA: ICD-10-CM

## 2025-02-21 DIAGNOSIS — Z85.3 HISTORY OF LEFT BREAST CANCER: Chronic | ICD-10-CM

## 2025-02-21 DIAGNOSIS — K22.70 BARRETT'S ESOPHAGUS WITHOUT DYSPLASIA: Chronic | ICD-10-CM

## 2025-02-21 DIAGNOSIS — K21.9 GERD WITHOUT ESOPHAGITIS: ICD-10-CM

## 2025-02-21 DIAGNOSIS — Z12.83 SKIN CANCER SCREENING: ICD-10-CM

## 2025-02-21 DIAGNOSIS — Z23 NEED FOR PROPHYLACTIC VACCINATION AGAINST STREPTOCOCCUS PNEUMONIAE (PNEUMOCOCCUS): ICD-10-CM

## 2025-02-21 DIAGNOSIS — Z90.710 H/O TOTAL HYSTERECTOMY: Chronic | ICD-10-CM

## 2025-02-21 DIAGNOSIS — Z12.11 SCREENING FOR COLON CANCER: ICD-10-CM

## 2025-02-21 DIAGNOSIS — E66.01 CLASS 3 SEVERE OBESITY DUE TO EXCESS CALORIES WITH SERIOUS COMORBIDITY AND BODY MASS INDEX (BMI) OF 40.0 TO 44.9 IN ADULT: ICD-10-CM

## 2025-02-21 DIAGNOSIS — E66.813 CLASS 3 SEVERE OBESITY DUE TO EXCESS CALORIES WITH SERIOUS COMORBIDITY AND BODY MASS INDEX (BMI) OF 40.0 TO 44.9 IN ADULT: ICD-10-CM

## 2025-02-21 DIAGNOSIS — Z00.00 GENERAL MEDICAL EXAM: Primary | ICD-10-CM

## 2025-02-21 PROBLEM — H65.03 NON-RECURRENT ACUTE SEROUS OTITIS MEDIA OF BOTH EARS: Status: RESOLVED | Noted: 2025-01-16 | Resolved: 2025-02-21

## 2025-02-21 PROBLEM — H81.12 BENIGN PAROXYSMAL POSITIONAL VERTIGO OF LEFT EAR: Status: RESOLVED | Noted: 2023-08-18 | Resolved: 2025-02-21

## 2025-02-21 PROBLEM — H92.03 OTALGIA OF BOTH EARS: Status: RESOLVED | Noted: 2025-01-16 | Resolved: 2025-02-21

## 2025-02-21 PROBLEM — Z98.82 HISTORY OF BILATERAL BREAST IMPLANTS: Status: RESOLVED | Noted: 2023-07-21 | Resolved: 2025-02-21

## 2025-02-21 PROBLEM — T85.43XA RUPTURED RIGHT BREAST IMPLANT: Status: RESOLVED | Noted: 2023-07-21 | Resolved: 2025-02-21

## 2025-02-21 PROBLEM — R00.2 FLUTTERING SENSATION OF HEART: Status: RESOLVED | Noted: 2024-08-21 | Resolved: 2025-02-21

## 2025-02-21 PROBLEM — R03.0 ELEVATED BP WITHOUT DIAGNOSIS OF HYPERTENSION: Status: RESOLVED | Noted: 2025-01-16 | Resolved: 2025-02-21

## 2025-02-21 PROBLEM — R50.9 FEVER AND CHILLS: Status: RESOLVED | Noted: 2024-07-02 | Resolved: 2025-02-21

## 2025-02-21 PROBLEM — H69.93 DYSFUNCTION OF BOTH EUSTACHIAN TUBES: Status: RESOLVED | Noted: 2025-01-16 | Resolved: 2025-02-21

## 2025-02-21 PROBLEM — J40 BRONCHITIS: Status: RESOLVED | Noted: 2024-06-29 | Resolved: 2025-02-21

## 2025-02-21 PROBLEM — J01.90 ACUTE NON-RECURRENT SINUSITIS: Status: RESOLVED | Noted: 2024-07-09 | Resolved: 2025-02-21

## 2025-02-21 PROBLEM — J10.1 INFLUENZA A: Status: RESOLVED | Noted: 2024-07-02 | Resolved: 2025-02-21

## 2025-02-21 PROBLEM — R10.13 EPIGASTRIC PAIN: Status: RESOLVED | Noted: 2024-03-25 | Resolved: 2025-02-21

## 2025-02-21 PROBLEM — R05.1 ACUTE COUGH: Status: RESOLVED | Noted: 2024-03-09 | Resolved: 2025-02-21

## 2025-02-21 PROCEDURE — 99396 PREV VISIT EST AGE 40-64: CPT | Performed by: FAMILY MEDICINE

## 2025-02-21 RX ORDER — FAMOTIDINE 40 MG/1
40 TABLET, FILM COATED ORAL DAILY
Qty: 90 TABLET | Refills: 1 | Status: SHIPPED | OUTPATIENT
Start: 2025-02-21

## 2025-02-21 RX ORDER — ESOMEPRAZOLE MAGNESIUM 40 MG/1
40 CAPSULE, DELAYED RELEASE ORAL
Qty: 90 CAPSULE | Refills: 1 | Status: SHIPPED | OUTPATIENT
Start: 2025-02-21

## 2025-02-21 NOTE — PROGRESS NOTES
"Chief Complaint  Annual Physical     Subjective    History of Present Illness:  Tania HERMOSILLO is a 55 y.o. female who presents today for physical and followup regarding menopausal symptoms, problems with wt loss/obesity, GERD/Velasquez's history    Still struggling with weight issues.  Had significant side-effects with compounded semiglutide.  Did well with compounded mounjaro in the past and was referred to wt management.     Did have cholecystectomy in 2024.    GERD/Velasquez's history - doing well with nexium and pepcid.  Cholecystectomy also helped GI issues    A1c improved with most recent labs - done 2/17/25!  Down to 5.8!     History of heart fluttering with full neg cardiac workup at [facility] a few years ago.  No chest pain or chest pressure.  Normal thyroid studies and magnesium with labs done before her appointment 2/17/25    Declines colonoscopy. Cologuard neg 8/7/23.  Plans for repeat Cologuard 8/2026    She initially decided to get the Prevnar 20 vaccine today but then changed her mind.  She would like the option to return for Prevnar 20 if interested but declines today.    Weight is down about 10 pounds was started on Zepbound.  She would like to try adjusting up to the 5 mg dosing.    She is still having some problems with muscle aches and sleep difficulty and we did discuss a trial with magnesium glycinate to help.    She does have Paxlovid to add in for menopausal symptoms but decided to hold off on starting Paxil at this point.    Objective   Vital Signs:   /86 (BP Location: Left arm, Patient Position: Sitting, Cuff Size: Large Adult)   Pulse 84   Ht 158.8 cm (62.5\")   Wt 106 kg (234 lb 6.4 oz)   SpO2 98%   BMI 42.19 kg/m²     Review of Systems   Constitutional:  Negative for appetite change, chills and fever.   HENT:  Negative for hearing loss.    Eyes:  Negative for blurred vision.   Respiratory:  Negative for chest tightness.    Cardiovascular:  Negative for chest pain.   Gastrointestinal:  " "Negative for abdominal pain.   Musculoskeletal:  Positive for myalgias. Negative for gait problem.   Skin:  Negative for rash.   Psychiatric/Behavioral:  Positive for sleep disturbance. Negative for depressed mood.        Past History:  Medical History: has a past medical history of ADHD (attention deficit hyperactivity disorder), Anxiety, Cancer (2012), Depression, GERD (gastroesophageal reflux disease), and Obesity.   Surgical History: has a past surgical history that includes Breast surgery (2012); Cosmetic surgery (2024); and Hysterectomy (2018).   Family History: family history includes Cancer in her maternal grandmother; Diabetes in her father, mother, and sister; Heart disease in her maternal grandmother; Obesity in her maternal uncle and mother.   Social History: reports that she is a non-smoker but has been exposed to tobacco smoke. She has never used smokeless tobacco. She reports that she does not currently use alcohol. She reports that she does not use drugs.      Current Outpatient Medications:     esomeprazole (nexIUM) 40 MG capsule, Take 1 capsule by mouth Every Morning Before Breakfast. BIOPSY PROVEN MILES'S ESOPHAGUS., Disp: 90 capsule, Rfl: 1    famotidine (PEPCID) 40 MG tablet, Take 1 tablet by mouth Daily., Disp: 90 tablet, Rfl: 1    PARoxetine (Paxil) 10 MG tablet, Take 1 tablet by mouth every night at bedtime. For menopausal symptoms, Disp: 90 tablet, Rfl: 1    Sure Comfort Insulin Syringe 31G X 5/16\" 0.5 ML misc, See Admin Instructions., Disp: , Rfl:     Tirzepatide-Weight Management (ZEPBOUND) 5 MG/0.5ML solution auto-injector, Inject 0.5 mL under the skin into the appropriate area as directed 1 (One) Time Per Week., Disp: 2 mL, Rfl: 3    Allergies: Patient has no known allergies.    Physical Exam  Constitutional:       Appearance: She is obese.   HENT:      Head: Normocephalic.      Right Ear: External ear normal.      Left Ear: External ear normal.      Nose: Nose normal.   Eyes:      " Pupils: Pupils are equal, round, and reactive to light.   Cardiovascular:      Rate and Rhythm: Normal rate and regular rhythm.      Heart sounds: Normal heart sounds.   Pulmonary:      Effort: Pulmonary effort is normal.      Breath sounds: Normal breath sounds.   Musculoskeletal:         General: Normal range of motion.      Cervical back: Normal range of motion.   Skin:     General: Skin is warm and dry.   Neurological:      General: No focal deficit present.      Mental Status: She is alert.   Psychiatric:         Mood and Affect: Mood normal.         Behavior: Behavior normal.         Thought Content: Thought content normal.          Result Review                   Assessment and Plan  Diagnoses and all orders for this visit:    1. General medical exam (Primary)  Assessment & Plan:  Discussed together health maintenance and screening along with vaccination options and healthy diet and exercise habits as part of the preventative counseling at their physical exam today.     Declines colonoscopy. Cologuard neg 8/7/23.  Plans for repeat Cologuard 8/2026    Orders:  -     CBC & Differential; Future  -     Comprehensive Metabolic Panel; Future  -     Lipid Panel; Future  -     TSH; Future  -     T4, Free; Future  -     Magnesium; Future    2. GERD without esophagitis  Assessment & Plan:  Refilled Pepcid and Nexium.  No dysphagia    Orders:  -     esomeprazole (nexIUM) 40 MG capsule; Take 1 capsule by mouth Every Morning Before Breakfast. BIOPSY PROVEN MILES'S ESOPHAGUS.  Dispense: 90 capsule; Refill: 1  -     famotidine (PEPCID) 40 MG tablet; Take 1 tablet by mouth Daily.  Dispense: 90 tablet; Refill: 1    3. Miles's esophagus without dysplasia  Assessment & Plan:  Continue Nexium and pepcid    Orders:  -     esomeprazole (nexIUM) 40 MG capsule; Take 1 capsule by mouth Every Morning Before Breakfast. BIOPSY PROVEN MILES'S ESOPHAGUS.  Dispense: 90 capsule; Refill: 1  -     famotidine (PEPCID) 40 MG tablet; Take  1 tablet by mouth Daily.  Dispense: 90 tablet; Refill: 1    4. History of left breast cancer  Assessment & Plan:  Status post bilateral mastectomy      5. H/O bilateral mastectomy  Assessment & Plan:  She does have Paxil to try at bedtime for menopausal symptoms but has decided to hold off at this point on medication trial.    She does understand with her past history of breast cancer and that immunotherapy would be contraindicated      6. H/O total hysterectomy  Assessment & Plan:  She does have Paxil to try at bedtime for menopausal symptoms but has decided to hold off at this point on medication trial.    She does understand with her past history of breast cancer and that immunotherapy would be contraindicated      7. Screening for colon cancer  Assessment & Plan:  Declines colonoscopy. Cologuard neg 8/7/23.  Plans for repeat Cologuard 8/2026      8. Menopausal symptoms  Assessment & Plan:  Tx options discussed    Hx of breast cancer so discussed no hormones    She does have low-dose paxil to try at bedtime but has not yet started on Paxil      9. Hyperglycemia  Assessment & Plan:  Encouraged diet and exercise efforts.      Reviewed improvement in A1c with last labs.    Orders:  -     Hemoglobin A1c; Future    10. Skin cancer screening  -     Ambulatory Referral to Dermatology    11. Class 3 severe obesity due to excess calories with serious comorbidity and body mass index (BMI) of 40.0 to 44.9 in adult  Assessment & Plan:  Patient's (Body mass index is 42.19 kg/m².) indicates that they are morbidly/severely obese (BMI > 40 or > 35 with obesity - related health condition) with health conditions that include GERD   . Weight is improving with treatment. BMI  is above average; BMI management plan is completed. We discussed portion control and increasing exercise.     Failed diet/exercise and wegovy with wt management    Rx sent in for zepbound to help -adjusted up to 5 mg     Orders:  -     Tirzepatide-Weight  Management (ZEPBOUND) 5 MG/0.5ML solution auto-injector; Inject 0.5 mL under the skin into the appropriate area as directed 1 (One) Time Per Week.  Dispense: 2 mL; Refill: 3    12. Need for prophylactic vaccination against Streptococcus pneumoniae (pneumococcus)  -     Cancel: Pneumococcal Conjugate Vaccine 20-Valent (PCV20)  -     Pneumococcal Conjugate Vaccine 20-Valent (PCV20); Future                   Follow Up  Return in about 6 months (around 8/21/2025) for Med recheck, Fasting labs 1 week before apt (Drink water).    Hung Castillo MD

## 2025-02-21 NOTE — ASSESSMENT & PLAN NOTE
She does have Paxil to try at bedtime for menopausal symptoms but has decided to hold off at this point on medication trial.    She does understand with her past history of breast cancer and that immunotherapy would be contraindicated

## 2025-02-21 NOTE — ASSESSMENT & PLAN NOTE
Tx options discussed    Hx of breast cancer so discussed no hormones    She does have low-dose paxil to try at bedtime but has not yet started on Paxil

## 2025-02-21 NOTE — ASSESSMENT & PLAN NOTE
Patient's (Body mass index is 42.19 kg/m².) indicates that they are morbidly/severely obese (BMI > 40 or > 35 with obesity - related health condition) with health conditions that include GERD   . Weight is improving with treatment. BMI  is above average; BMI management plan is completed. We discussed portion control and increasing exercise.     Failed diet/exercise and wegovy with wt management    Rx sent in for zepbound to help -adjusted up to 5 mg

## 2025-02-21 NOTE — ASSESSMENT & PLAN NOTE
Discussed together health maintenance and screening along with vaccination options and healthy diet and exercise habits as part of the preventative counseling at their physical exam today.     Declines colonoscopy. Cologuard neg 8/7/23.  Plans for repeat Cologuard 8/2026

## 2025-05-19 DIAGNOSIS — K21.9 GERD WITHOUT ESOPHAGITIS: ICD-10-CM

## 2025-05-19 DIAGNOSIS — K22.70 BARRETT'S ESOPHAGUS WITHOUT DYSPLASIA: Chronic | ICD-10-CM

## 2025-05-19 RX ORDER — FAMOTIDINE 40 MG/1
40 TABLET, FILM COATED ORAL DAILY
Qty: 90 TABLET | Refills: 1 | Status: SHIPPED | OUTPATIENT
Start: 2025-05-19

## 2025-05-19 NOTE — TELEPHONE ENCOUNTER
Requested Prescriptions:   Requested Prescriptions     Pending Prescriptions Disp Refills    famotidine (PEPCID) 40 MG tablet 90 tablet 1     Sig: Take 1 tablet by mouth Daily.        Pharmacy where request should be sent: [id number] - [place], [place] - [address]  [address] - [phone]  - [phone] FX     Last office visit with prescribing clinician: 2/21/2025   Last telemedicine visit with prescribing clinician: Visit date not found   Next office visit with prescribing clinician: 8/22/2025         Flor Kirkland Rep   05/19/25 09:56 EDT

## 2025-06-09 ENCOUNTER — PATIENT MESSAGE (OUTPATIENT)
Dept: FAMILY MEDICINE CLINIC | Facility: CLINIC | Age: 55
End: 2025-06-09
Payer: COMMERCIAL

## 2025-06-09 DIAGNOSIS — E66.01 CLASS 3 SEVERE OBESITY DUE TO EXCESS CALORIES WITH SERIOUS COMORBIDITY AND BODY MASS INDEX (BMI) OF 40.0 TO 44.9 IN ADULT: Primary | ICD-10-CM

## 2025-06-09 DIAGNOSIS — E66.813 CLASS 3 SEVERE OBESITY DUE TO EXCESS CALORIES WITH SERIOUS COMORBIDITY AND BODY MASS INDEX (BMI) OF 40.0 TO 44.9 IN ADULT: Primary | ICD-10-CM

## 2025-06-18 DIAGNOSIS — N95.1 MENOPAUSAL SYMPTOMS: ICD-10-CM

## 2025-06-18 RX ORDER — PAROXETINE 10 MG/1
10 TABLET, FILM COATED ORAL
Qty: 90 TABLET | Refills: 1 | Status: SHIPPED | OUTPATIENT
Start: 2025-06-18

## 2025-06-18 NOTE — TELEPHONE ENCOUNTER
Requested Prescriptions:   Requested Prescriptions     Pending Prescriptions Disp Refills    PARoxetine (Paxil) 10 MG tablet 90 tablet 1     Sig: Take 1 tablet by mouth every night at bedtime. For menopausal symptoms        Pharmacy where request should be sent: [id number] - [place], [place] - [address]  [address] - [phone]  - [phone] FX     Last office visit with prescribing clinician: 2/21/2025   Last telemedicine visit with prescribing clinician: Visit date not found   Next office visit with prescribing clinician: 8/22/2025       Flor Kirkland Rep   06/18/25 10:18 EDT

## 2025-06-23 ENCOUNTER — TELEPHONE (OUTPATIENT)
Dept: FAMILY MEDICINE CLINIC | Facility: CLINIC | Age: 55
End: 2025-06-23
Payer: COMMERCIAL

## 2025-07-06 DIAGNOSIS — K21.9 GERD WITHOUT ESOPHAGITIS: ICD-10-CM

## 2025-07-06 DIAGNOSIS — K22.70 BARRETT'S ESOPHAGUS WITHOUT DYSPLASIA: Chronic | ICD-10-CM

## 2025-07-07 RX ORDER — ESOMEPRAZOLE MAGNESIUM 40 MG/1
40 CAPSULE, DELAYED RELEASE ORAL
Qty: 90 CAPSULE | Refills: 1 | Status: SHIPPED | OUTPATIENT
Start: 2025-07-07

## 2025-08-14 ENCOUNTER — LAB (OUTPATIENT)
Dept: FAMILY MEDICINE CLINIC | Facility: CLINIC | Age: 55
End: 2025-08-14
Payer: COMMERCIAL

## 2025-08-22 ENCOUNTER — OFFICE VISIT (OUTPATIENT)
Dept: FAMILY MEDICINE CLINIC | Facility: CLINIC | Age: 55
End: 2025-08-22
Payer: COMMERCIAL

## 2025-08-22 VITALS
WEIGHT: 217.2 LBS | HEART RATE: 83 BPM | DIASTOLIC BLOOD PRESSURE: 86 MMHG | BODY MASS INDEX: 38.48 KG/M2 | OXYGEN SATURATION: 98 % | HEIGHT: 63 IN | SYSTOLIC BLOOD PRESSURE: 124 MMHG

## 2025-08-22 DIAGNOSIS — E66.812 CLASS 2 OBESITY DUE TO EXCESS CALORIES WITHOUT SERIOUS COMORBIDITY WITH BODY MASS INDEX (BMI) OF 39.0 TO 39.9 IN ADULT: ICD-10-CM

## 2025-08-22 DIAGNOSIS — M79.18 MYALGIA, MULTIPLE SITES: ICD-10-CM

## 2025-08-22 DIAGNOSIS — J30.1 SEASONAL ALLERGIC RHINITIS DUE TO POLLEN: ICD-10-CM

## 2025-08-22 DIAGNOSIS — Z85.3 HISTORY OF LEFT BREAST CANCER: Chronic | ICD-10-CM

## 2025-08-22 DIAGNOSIS — R73.9 HYPERGLYCEMIA: ICD-10-CM

## 2025-08-22 DIAGNOSIS — K21.9 GERD WITHOUT ESOPHAGITIS: ICD-10-CM

## 2025-08-22 DIAGNOSIS — K22.70 BARRETT'S ESOPHAGUS WITHOUT DYSPLASIA: Primary | Chronic | ICD-10-CM

## 2025-08-22 DIAGNOSIS — N95.1 MENOPAUSAL SYMPTOMS: ICD-10-CM

## 2025-08-22 DIAGNOSIS — E66.09 CLASS 2 OBESITY DUE TO EXCESS CALORIES WITHOUT SERIOUS COMORBIDITY WITH BODY MASS INDEX (BMI) OF 39.0 TO 39.9 IN ADULT: ICD-10-CM

## 2025-08-22 DIAGNOSIS — G89.28 POST-MASTECTOMY PAIN SYNDROME: ICD-10-CM

## 2025-08-22 DIAGNOSIS — Z90.13 H/O BILATERAL MASTECTOMY: Chronic | ICD-10-CM

## 2025-08-22 RX ORDER — ESOMEPRAZOLE MAGNESIUM 40 MG/1
40 CAPSULE, DELAYED RELEASE ORAL
Qty: 90 CAPSULE | Refills: 1 | Status: SHIPPED | OUTPATIENT
Start: 2025-08-22

## 2025-08-22 RX ORDER — FLUTICASONE PROPIONATE 50 MCG
2 SPRAY, SUSPENSION (ML) NASAL DAILY
Qty: 48 G | Refills: 2 | Status: SHIPPED | OUTPATIENT
Start: 2025-08-22

## 2025-08-22 RX ORDER — FEXOFENADINE HCL 180 MG/1
180 TABLET ORAL DAILY
Qty: 90 TABLET | Refills: 2 | Status: SHIPPED | OUTPATIENT
Start: 2025-08-22

## 2025-08-22 RX ORDER — DULOXETIN HYDROCHLORIDE 30 MG/1
30 CAPSULE, DELAYED RELEASE ORAL DAILY
Qty: 30 CAPSULE | Refills: 6 | Status: SHIPPED | OUTPATIENT
Start: 2025-08-22

## 2025-08-22 RX ORDER — FAMOTIDINE 40 MG/1
40 TABLET, FILM COATED ORAL 2 TIMES DAILY PRN
Qty: 180 TABLET | Refills: 1 | Status: SHIPPED | OUTPATIENT
Start: 2025-08-22